# Patient Record
Sex: MALE | Employment: OTHER | URBAN - METROPOLITAN AREA
[De-identification: names, ages, dates, MRNs, and addresses within clinical notes are randomized per-mention and may not be internally consistent; named-entity substitution may affect disease eponyms.]

---

## 2017-02-15 PROBLEM — I48.0 PAROXYSMAL ATRIAL FIBRILLATION (HCC): Status: ACTIVE | Noted: 2017-02-15

## 2017-02-15 PROBLEM — I10 HYPERTENSION: Status: ACTIVE | Noted: 2017-02-15

## 2020-07-23 NOTE — PAT NURSING NOTE
WARREN 0530 @ [facility], park in [facility]/reg hh lobby. NPO after 2100, take oxycodone and inhalers in am with sip of water. LD vitamins/supplements/NSAIDS 7/28. ASA 7/30. Tylenol is okay to take week of surgery.  Continue rest of medications through 8/4. show

## 2020-07-24 ENCOUNTER — HOSPITAL ENCOUNTER (OUTPATIENT)
Dept: ULTRASOUND IMAGING | Facility: HOSPITAL | Age: 70
Discharge: HOME | End: 2020-07-24
Attending: THORACIC SURGERY (CARDIOTHORACIC VASCULAR SURGERY)
Payer: MEDICARE

## 2020-07-24 ENCOUNTER — HOSPITAL ENCOUNTER (OUTPATIENT)
Dept: GENERAL RADIOLOGY | Facility: HOSPITAL | Age: 70
Discharge: HOME | End: 2020-07-24
Attending: THORACIC SURGERY (CARDIOTHORACIC VASCULAR SURGERY)
Payer: MEDICARE

## 2020-07-24 ENCOUNTER — APPOINTMENT (OUTPATIENT)
Dept: LAB | Facility: HOSPITAL | Age: 70
End: 2020-07-24
Attending: THORACIC SURGERY (CARDIOTHORACIC VASCULAR SURGERY)
Payer: MEDICARE

## 2020-07-24 DIAGNOSIS — Z01.818 PREOP TESTING: ICD-10-CM

## 2020-07-24 DIAGNOSIS — Z91.89 AT RISK FOR BLEEDING: ICD-10-CM

## 2020-07-24 DIAGNOSIS — I35.1 AORTIC INSUFFICIENCY: ICD-10-CM

## 2020-07-24 DIAGNOSIS — I48.0 PAROXYSMAL ATRIAL FIBRILLATION (HCC): ICD-10-CM

## 2020-07-24 DIAGNOSIS — Z91.89 AT RISK FOR INFECTION: ICD-10-CM

## 2020-07-24 LAB
ALBUMIN SERPL-MCNC: 4.1 G/DL (ref 3.4–5)
ALBUMIN/GLOB SERPL: 1.1 {RATIO} (ref 1–2)
ALP LIVER SERPL-CCNC: 77 U/L (ref 45–117)
ALT SERPL-CCNC: 33 U/L (ref 16–61)
ANION GAP SERPL CALC-SCNC: 6 MMOL/L (ref 0–18)
ANTIBODY SCREEN: NEGATIVE
APTT PPP: 32.2 SECONDS (ref 25.4–36.1)
AST SERPL-CCNC: 17 U/L (ref 15–37)
ATRIAL RATE: 86 BPM
BASOPHILS # BLD AUTO: 0.04 X10(3) UL (ref 0–0.2)
BASOPHILS NFR BLD AUTO: 0.5 %
BILIRUB SERPL-MCNC: 0.8 MG/DL (ref 0.1–2)
BILIRUB UR QL STRIP.AUTO: NEGATIVE
BUN BLD-MCNC: 29 MG/DL (ref 7–18)
BUN/CREAT SERPL: 19.6 (ref 10–20)
CALCIUM BLD-MCNC: 9.8 MG/DL (ref 8.5–10.1)
CHLORIDE SERPL-SCNC: 95 MMOL/L (ref 98–112)
CO2 SERPL-SCNC: 29 MMOL/L (ref 21–32)
COLOR UR AUTO: YELLOW
CREAT BLD-MCNC: 1.48 MG/DL (ref 0.7–1.3)
DEPRECATED RDW RBC AUTO: 39.2 FL (ref 35.1–46.3)
EOSINOPHIL # BLD AUTO: 0.29 X10(3) UL (ref 0–0.7)
EOSINOPHIL NFR BLD AUTO: 3.4 %
ERYTHROCYTE [DISTWIDTH] IN BLOOD BY AUTOMATED COUNT: 11.9 % (ref 11–15)
EST. AVERAGE GLUCOSE BLD GHB EST-MCNC: 203 MG/DL (ref 68–126)
GLOBULIN PLAS-MCNC: 3.8 G/DL (ref 2.8–4.4)
GLUCOSE BLD-MCNC: 242 MG/DL (ref 70–99)
GLUCOSE UR STRIP.AUTO-MCNC: 50 MG/DL
HBA1C MFR BLD HPLC: 8.7 % (ref ?–5.7)
HCT VFR BLD AUTO: 45.3 % (ref 39–53)
HGB BLD-MCNC: 15.6 G/DL (ref 13–17.5)
IMM GRANULOCYTES # BLD AUTO: 0.04 X10(3) UL (ref 0–1)
IMM GRANULOCYTES NFR BLD: 0.5 %
INR BLD: 0.96 (ref 0.89–1.11)
KETONES UR STRIP.AUTO-MCNC: NEGATIVE MG/DL
LEUKOCYTE ESTERASE UR QL STRIP.AUTO: NEGATIVE
LYMPHOCYTES # BLD AUTO: 2.23 X10(3) UL (ref 1–4)
LYMPHOCYTES NFR BLD AUTO: 25.8 %
M PROTEIN MFR SERPL ELPH: 7.9 G/DL (ref 6.4–8.2)
MCH RBC QN AUTO: 31.1 PG (ref 26–34)
MCHC RBC AUTO-ENTMCNC: 34.4 G/DL (ref 31–37)
MCV RBC AUTO: 90.4 FL (ref 80–100)
MONOCYTES # BLD AUTO: 0.8 X10(3) UL (ref 0.1–1)
MONOCYTES NFR BLD AUTO: 9.2 %
NEUTROPHILS # BLD AUTO: 5.25 X10 (3) UL (ref 1.5–7.7)
NEUTROPHILS # BLD AUTO: 5.25 X10(3) UL (ref 1.5–7.7)
NEUTROPHILS NFR BLD AUTO: 60.6 %
NITRITE UR QL STRIP.AUTO: NEGATIVE
OSMOLALITY SERPL CALC.SUM OF ELEC: 284 MOSM/KG (ref 275–295)
P AXIS: 29 DEGREES
P-R INTERVAL: 206 MS
PATIENT FASTING Y/N/NP: NO
PH UR STRIP.AUTO: 5 [PH] (ref 4.5–8)
PLATELET # BLD AUTO: 197 10(3)UL (ref 150–450)
POTASSIUM SERPL-SCNC: 4.8 MMOL/L (ref 3.5–5.1)
PROT UR STRIP.AUTO-MCNC: NEGATIVE MG/DL
PSA SERPL DL<=0.01 NG/ML-MCNC: 13.1 SECONDS (ref 12.4–14.6)
Q-T INTERVAL: 378 MS
QRS DURATION: 104 MS
QTC CALCULATION (BEZET): 452 MS
R AXIS: -21 DEGREES
RBC # BLD AUTO: 5.01 X10(6)UL (ref 3.8–5.8)
RBC UR QL AUTO: NEGATIVE
RH BLOOD TYPE: POSITIVE
SODIUM SERPL-SCNC: 130 MMOL/L (ref 136–145)
SP GR UR STRIP.AUTO: 1.01 (ref 1–1.03)
T AXIS: 68 DEGREES
UROBILINOGEN UR STRIP.AUTO-MCNC: <2 MG/DL
VENTRICULAR RATE: 86 BPM
WBC # BLD AUTO: 8.7 X10(3) UL (ref 4–11)

## 2020-07-24 PROCEDURE — 85025 COMPLETE CBC W/AUTO DIFF WBC: CPT

## 2020-07-24 PROCEDURE — 85610 PROTHROMBIN TIME: CPT

## 2020-07-24 PROCEDURE — 36415 COLL VENOUS BLD VENIPUNCTURE: CPT

## 2020-07-24 PROCEDURE — 81003 URINALYSIS AUTO W/O SCOPE: CPT

## 2020-07-24 PROCEDURE — 93010 ELECTROCARDIOGRAM REPORT: CPT | Performed by: INTERNAL MEDICINE

## 2020-07-24 PROCEDURE — 80053 COMPREHEN METABOLIC PANEL: CPT

## 2020-07-24 PROCEDURE — 85730 THROMBOPLASTIN TIME PARTIAL: CPT

## 2020-07-24 PROCEDURE — 93880 EXTRACRANIAL BILAT STUDY: CPT | Performed by: THORACIC SURGERY (CARDIOTHORACIC VASCULAR SURGERY)

## 2020-07-24 PROCEDURE — 86900 BLOOD TYPING SEROLOGIC ABO: CPT

## 2020-07-24 PROCEDURE — 86901 BLOOD TYPING SEROLOGIC RH(D): CPT

## 2020-07-24 PROCEDURE — 93005 ELECTROCARDIOGRAM TRACING: CPT

## 2020-07-24 PROCEDURE — 83036 HEMOGLOBIN GLYCOSYLATED A1C: CPT

## 2020-07-24 PROCEDURE — 71045 X-RAY EXAM CHEST 1 VIEW: CPT | Performed by: THORACIC SURGERY (CARDIOTHORACIC VASCULAR SURGERY)

## 2020-07-24 PROCEDURE — 86850 RBC ANTIBODY SCREEN: CPT

## 2020-07-30 ENCOUNTER — TELEPHONE (OUTPATIENT)
Dept: CARDIOLOGY UNIT | Facility: HOSPITAL | Age: 70
End: 2020-07-30

## 2020-07-30 NOTE — PROGRESS NOTES
Reviewed labs with Dr. Melissa Finch, ok to proceed.   Amador Velasquez RN  Clinical Coordinator  CV Surgery

## 2020-08-03 ENCOUNTER — LAB ENCOUNTER (OUTPATIENT)
Dept: LAB | Facility: HOSPITAL | Age: 70
DRG: 220 | End: 2020-08-03
Attending: THORACIC SURGERY (CARDIOTHORACIC VASCULAR SURGERY)
Payer: MEDICARE

## 2020-08-03 DIAGNOSIS — I48.0 PAROXYSMAL ATRIAL FIBRILLATION (HCC): ICD-10-CM

## 2020-08-03 DIAGNOSIS — Z01.818 PREOP TESTING: ICD-10-CM

## 2020-08-03 DIAGNOSIS — I35.1 AORTIC INSUFFICIENCY: ICD-10-CM

## 2020-08-03 DIAGNOSIS — Z91.89 AT RISK FOR INFECTION: ICD-10-CM

## 2020-08-03 DIAGNOSIS — Z91.89 AT RISK FOR BLEEDING: ICD-10-CM

## 2020-08-03 LAB — SARS-COV-2 RNA RESP QL NAA+PROBE: NOT DETECTED

## 2020-08-05 ENCOUNTER — APPOINTMENT (OUTPATIENT)
Dept: GENERAL RADIOLOGY | Facility: HOSPITAL | Age: 70
DRG: 220 | End: 2020-08-05
Attending: PHYSICIAN ASSISTANT
Payer: MEDICARE

## 2020-08-05 ENCOUNTER — APPOINTMENT (OUTPATIENT)
Dept: GENERAL RADIOLOGY | Facility: HOSPITAL | Age: 70
DRG: 220 | End: 2020-08-05
Attending: THORACIC SURGERY (CARDIOTHORACIC VASCULAR SURGERY)
Payer: MEDICARE

## 2020-08-05 ENCOUNTER — HOSPITAL ENCOUNTER (INPATIENT)
Facility: HOSPITAL | Age: 70
LOS: 7 days | Discharge: HOME HEALTH | DRG: 220 | End: 2020-08-12
Attending: THORACIC SURGERY (CARDIOTHORACIC VASCULAR SURGERY) | Admitting: THORACIC SURGERY (CARDIOTHORACIC VASCULAR SURGERY)
Payer: MEDICARE

## 2020-08-05 ENCOUNTER — ANESTHESIA (OUTPATIENT)
Dept: CARDIAC SURGERY | Facility: HOSPITAL | Age: 70
DRG: 220 | End: 2020-08-05
Payer: MEDICARE

## 2020-08-05 ENCOUNTER — ANESTHESIA EVENT (OUTPATIENT)
Dept: CARDIAC SURGERY | Facility: HOSPITAL | Age: 70
DRG: 220 | End: 2020-08-05
Payer: MEDICARE

## 2020-08-05 DIAGNOSIS — Z91.89 AT RISK FOR INFECTION: ICD-10-CM

## 2020-08-05 DIAGNOSIS — Z01.818 PREOP TESTING: ICD-10-CM

## 2020-08-05 DIAGNOSIS — I48.0 PAROXYSMAL ATRIAL FIBRILLATION (HCC): ICD-10-CM

## 2020-08-05 DIAGNOSIS — I35.1 AORTIC INSUFFICIENCY: Primary | ICD-10-CM

## 2020-08-05 DIAGNOSIS — Z91.89 AT RISK FOR BLEEDING: ICD-10-CM

## 2020-08-05 LAB
APTT PPP: 33.7 SECONDS (ref 25.4–36.1)
ARTERIAL BLD GAS O2 SATURATION: 96 % (ref 92–100)
ARTERIAL BLOOD GAS BASE EXCESS: -3.1 MMOL/L (ref ?–2)
ARTERIAL BLOOD GAS HCO3: 20.5 MEQ/L (ref 22–26)
ARTERIAL BLOOD GAS PCO2: 34 MM HG (ref 35–45)
ARTERIAL BLOOD GAS PH: 7.4 (ref 7.35–7.45)
ARTERIAL BLOOD GAS PO2: 108 MM HG (ref 80–105)
ATRIAL RATE: 90 BPM
BUN BLD-MCNC: 24 MG/DL (ref 7–18)
CALCIUM BLD-MCNC: 7.9 MG/DL (ref 8.5–10.1)
CALCULATED O2 SATURATION: 98 % (ref 92–100)
CARBOXYHEMOGLOBIN: 0.8 % SAT (ref 0–3)
CHLORIDE SERPL-SCNC: 108 MMOL/L (ref 98–112)
CHOLEST SMN-MCNC: 118 MG/DL (ref ?–200)
CO2 SERPL-SCNC: 26 MMOL/L (ref 21–32)
CREAT BLD-MCNC: 1.02 MG/DL (ref 0.7–1.3)
DEPRECATED RDW RBC AUTO: 41 FL (ref 35.1–46.3)
ERYTHROCYTE [DISTWIDTH] IN BLOOD BY AUTOMATED COUNT: 12 % (ref 11–15)
FIBRINOGEN: 241 MG/DL (ref 200–446)
FIO2: 40 %
FIO2: 50 %
GLUCOSE BLD-MCNC: 115 MG/DL (ref 70–99)
GLUCOSE BLD-MCNC: 118 MG/DL (ref 70–99)
GLUCOSE BLD-MCNC: 130 MG/DL (ref 70–99)
GLUCOSE BLD-MCNC: 130 MG/DL (ref 70–99)
GLUCOSE BLD-MCNC: 135 MG/DL (ref 70–99)
GLUCOSE BLD-MCNC: 144 MG/DL (ref 70–99)
GLUCOSE BLD-MCNC: 149 MG/DL (ref 70–99)
GLUCOSE BLD-MCNC: 154 MG/DL (ref 70–99)
GLUCOSE BLD-MCNC: 159 MG/DL (ref 70–99)
GLUCOSE BLD-MCNC: 165 MG/DL (ref 70–99)
GLUCOSE BLD-MCNC: 167 MG/DL (ref 70–99)
GLUCOSE BLD-MCNC: 167 MG/DL (ref 70–99)
GLUCOSE BLD-MCNC: 168 MG/DL (ref 70–99)
GLUCOSE BLD-MCNC: 173 MG/DL (ref 70–99)
GLUCOSE BLD-MCNC: 179 MG/DL (ref 70–99)
GLUCOSE BLD-MCNC: 184 MG/DL (ref 70–99)
GLUCOSE BLD-MCNC: 199 MG/DL (ref 70–99)
GLUCOSE BLD-MCNC: 215 MG/DL (ref 70–99)
GLUCOSE BLD-MCNC: 217 MG/DL (ref 70–99)
GLUCOSE BLD-MCNC: 224 MG/DL (ref 70–99)
HAV IGM SER QL: 2.2 MG/DL (ref 1.6–2.6)
HCT VFR BLD AUTO: 36.3 % (ref 39–53)
HDLC SERPL-MCNC: 48 MG/DL (ref 40–59)
HGB BLD-MCNC: 12.1 G/DL (ref 13–17.5)
INR BLD: 1.41 (ref 0.89–1.11)
ISTAT ACTIVATED CLOTTING TIME: 114 SECONDS (ref 74–137)
ISTAT ACTIVATED CLOTTING TIME: 125 SECONDS (ref 74–137)
ISTAT ACTIVATED CLOTTING TIME: 125 SECONDS (ref 74–137)
ISTAT ACTIVATED CLOTTING TIME: 500 SECONDS (ref 74–137)
ISTAT ACTIVATED CLOTTING TIME: 527 SECONDS (ref 74–137)
ISTAT ACTIVATED CLOTTING TIME: 560 SECONDS (ref 74–137)
ISTAT ACTIVATED CLOTTING TIME: 577 SECONDS (ref 74–137)
ISTAT BLOOD GAS BASE EXCESS: -1 MMOL/L (ref ?–30)
ISTAT BLOOD GAS BASE EXCESS: -3 MMOL/L (ref ?–30)
ISTAT BLOOD GAS BASE EXCESS: -3 MMOL/L (ref ?–30)
ISTAT BLOOD GAS BASE EXCESS: -4 MMOL/L (ref ?–30)
ISTAT BLOOD GAS BASE EXCESS: 0 MMOL/L (ref ?–30)
ISTAT BLOOD GAS BASE EXCESS: 0 MMOL/L (ref ?–30)
ISTAT BLOOD GAS HCO3: 22.4 MEQ/L (ref 22–26)
ISTAT BLOOD GAS HCO3: 23.3 MEQ/L (ref 22–26)
ISTAT BLOOD GAS HCO3: 24.3 MEQ/L (ref 22–26)
ISTAT BLOOD GAS HCO3: 24.4 MEQ/L (ref 22–26)
ISTAT BLOOD GAS HCO3: 24.6 MEQ/L (ref 22–26)
ISTAT BLOOD GAS HCO3: 24.9 MEQ/L (ref 22–26)
ISTAT BLOOD GAS O2 SATURATION: 100 % (ref 92–100)
ISTAT BLOOD GAS O2 SATURATION: 100 % (ref 92–100)
ISTAT BLOOD GAS O2 SATURATION: 63 % (ref 60–85)
ISTAT BLOOD GAS O2 SATURATION: 87 % (ref 60–85)
ISTAT BLOOD GAS O2 SATURATION: 88 % (ref 60–85)
ISTAT BLOOD GAS O2 SATURATION: 98 % (ref 92–100)
ISTAT BLOOD GAS PCO2: 35.7 MMHG (ref 38–50)
ISTAT BLOOD GAS PCO2: 40.3 MMHG (ref 35–45)
ISTAT BLOOD GAS PCO2: 40.8 MMHG (ref 38–50)
ISTAT BLOOD GAS PCO2: 43.1 MMHG (ref 35–45)
ISTAT BLOOD GAS PCO2: 52.9 MMHG (ref 35–45)
ISTAT BLOOD GAS PCO2: 55.8 MMHG (ref 38–50)
ISTAT BLOOD GAS PH: 7.25 (ref 7.32–7.43)
ISTAT BLOOD GAS PH: 7.25 (ref 7.35–7.45)
ISTAT BLOOD GAS PH: 7.35 (ref 7.32–7.43)
ISTAT BLOOD GAS PH: 7.36 (ref 7.35–7.45)
ISTAT BLOOD GAS PH: 7.4 (ref 7.35–7.45)
ISTAT BLOOD GAS PH: 7.45 (ref 7.32–7.43)
ISTAT BLOOD GAS PO2: 130 MMHG (ref 80–105)
ISTAT BLOOD GAS PO2: 219 MMHG (ref 80–105)
ISTAT BLOOD GAS PO2: 398 MMHG (ref 80–105)
ISTAT BLOOD GAS PO2: <70 MMHG (ref 35–40)
ISTAT BLOOD GAS TCO2: 24 MMOL/L (ref 22–32)
ISTAT BLOOD GAS TCO2: 25 MMOL/L (ref 22–32)
ISTAT BLOOD GAS TCO2: 26 MMOL/L (ref 22–32)
ISTAT HEMATOCRIT: 27 % (ref 37–53)
ISTAT HEMATOCRIT: 31 % (ref 37–53)
ISTAT HEMATOCRIT: 34 % (ref 37–53)
ISTAT HEMATOCRIT: 34 % (ref 37–53)
ISTAT IONIZED CALCIUM: 1.11 MMOL/L (ref 1.12–1.32)
ISTAT IONIZED CALCIUM: 1.18 MMOL/L (ref 1.12–1.32)
ISTAT IONIZED CALCIUM: 1.22 MMOL/L (ref 1.12–1.32)
ISTAT IONIZED CALCIUM: 1.23 MMOL/L (ref 1.12–1.32)
ISTAT IONIZED CALCIUM: 1.3 MMOL/L (ref 1.12–1.32)
ISTAT IONIZED CALCIUM: 1.52 MMOL/L (ref 1.12–1.32)
ISTAT POTASSIUM: 4.3 MMOL/L (ref 3.6–5.1)
ISTAT POTASSIUM: 4.7 MMOL/L (ref 3.6–5.1)
ISTAT POTASSIUM: 4.7 MMOL/L (ref 3.6–5.1)
ISTAT POTASSIUM: 5.8 MMOL/L (ref 3.6–5.1)
ISTAT POTASSIUM: 6 MMOL/L (ref 3.6–5.1)
ISTAT POTASSIUM: 6.5 MMOL/L (ref 3.6–5.1)
ISTAT SODIUM: 127 MMOL/L (ref 136–145)
ISTAT SODIUM: 130 MMOL/L (ref 136–145)
ISTAT SODIUM: 131 MMOL/L (ref 136–145)
ISTAT SODIUM: 134 MMOL/L (ref 136–145)
ISTAT SODIUM: 136 MMOL/L (ref 136–145)
ISTAT SODIUM: 139 MMOL/L (ref 136–145)
LDLC SERPL CALC-MCNC: 31 MG/DL (ref ?–100)
MCH RBC QN AUTO: 31.3 PG (ref 26–34)
MCHC RBC AUTO-ENTMCNC: 33.3 G/DL (ref 31–37)
MCV RBC AUTO: 93.8 FL (ref 80–100)
METHEMOGLOBIN: 0.5 % SAT (ref 0.4–1.5)
NONHDLC SERPL-MCNC: 70 MG/DL (ref ?–130)
P AXIS: 60 DEGREES
P/F RATIO: 247.1 MMHG
PATIENT TEMPERATURE: 101.3 F
PEEP: 5 CM H2O
PEEP: 5 CM H2O
PLATELET # BLD AUTO: 104 10(3)UL (ref 150–450)
POTASSIUM SERPL-SCNC: 4.2 MMOL/L (ref 3.5–5.1)
PRESSURE SUPPORT: 5 CM H2O
PSA SERPL DL<=0.01 NG/ML-MCNC: 17.7 SECONDS (ref 12.4–14.6)
Q-T INTERVAL: 254 MS
QRS DURATION: 114 MS
QTC CALCULATION (BEZET): 443 MS
R AXIS: -32 DEGREES
RBC # BLD AUTO: 3.87 X10(6)UL (ref 3.8–5.8)
SODIUM SERPL-SCNC: 137 MMOL/L (ref 136–145)
T AXIS: 137 DEGREES
TIDAL VOLUME: 600 ML
TOTAL HEMOGLOBIN: 12.6 G/DL (ref 12.6–17.4)
TRIGL SERPL-MCNC: 195 MG/DL (ref 30–149)
VENOUS BASE EXCESS: -2.7
VENOUS BLOOD GAS HCO3: 23.3 MEQ/L (ref 23–27)
VENOUS O2 SAT CALC: 51 % (ref 72–78)
VENOUS O2 SATURATION: 51 % (ref 72–78)
VENOUS PCO2: 45 MM HG (ref 38–50)
VENOUS PH: 7.33 (ref 7.33–7.43)
VENOUS PO2: 30 MM HG (ref 30–50)
VENT RATE: 14 /MIN
VENTRICULAR RATE: 183 BPM
VLDLC SERPL CALC-MCNC: 39 MG/DL (ref 0–30)
WBC # BLD AUTO: 8.6 X10(3) UL (ref 4–11)

## 2020-08-05 PROCEDURE — 85347 COAGULATION TIME ACTIVATED: CPT

## 2020-08-05 PROCEDURE — 84295 ASSAY OF SERUM SODIUM: CPT

## 2020-08-05 PROCEDURE — 76942 ECHO GUIDE FOR BIOPSY: CPT | Performed by: ANESTHESIOLOGY

## 2020-08-05 PROCEDURE — 93312 ECHO TRANSESOPHAGEAL: CPT | Performed by: ANESTHESIOLOGY

## 2020-08-05 PROCEDURE — 88311 DECALCIFY TISSUE: CPT | Performed by: THORACIC SURGERY (CARDIOTHORACIC VASCULAR SURGERY)

## 2020-08-05 PROCEDURE — 82330 ASSAY OF CALCIUM: CPT

## 2020-08-05 PROCEDURE — 80061 LIPID PANEL: CPT | Performed by: THORACIC SURGERY (CARDIOTHORACIC VASCULAR SURGERY)

## 2020-08-05 PROCEDURE — 80048 BASIC METABOLIC PNL TOTAL CA: CPT | Performed by: THORACIC SURGERY (CARDIOTHORACIC VASCULAR SURGERY)

## 2020-08-05 PROCEDURE — 36415 COLL VENOUS BLD VENIPUNCTURE: CPT

## 2020-08-05 PROCEDURE — 83050 HGB METHEMOGLOBIN QUAN: CPT | Performed by: ANESTHESIOLOGY

## 2020-08-05 PROCEDURE — 5A1221Z PERFORMANCE OF CARDIAC OUTPUT, CONTINUOUS: ICD-10-PCS | Performed by: THORACIC SURGERY (CARDIOTHORACIC VASCULAR SURGERY)

## 2020-08-05 PROCEDURE — 88305 TISSUE EXAM BY PATHOLOGIST: CPT | Performed by: THORACIC SURGERY (CARDIOTHORACIC VASCULAR SURGERY)

## 2020-08-05 PROCEDURE — 71045 X-RAY EXAM CHEST 1 VIEW: CPT | Performed by: PHYSICIAN ASSISTANT

## 2020-08-05 PROCEDURE — C9113 INJ PANTOPRAZOLE SODIUM, VIA: HCPCS | Performed by: THORACIC SURGERY (CARDIOTHORACIC VASCULAR SURGERY)

## 2020-08-05 PROCEDURE — P9047 ALBUMIN (HUMAN), 25%, 50ML: HCPCS

## 2020-08-05 PROCEDURE — 85027 COMPLETE CBC AUTOMATED: CPT | Performed by: THORACIC SURGERY (CARDIOTHORACIC VASCULAR SURGERY)

## 2020-08-05 PROCEDURE — 82375 ASSAY CARBOXYHB QUANT: CPT | Performed by: ANESTHESIOLOGY

## 2020-08-05 PROCEDURE — 02580ZZ DESTRUCTION OF CONDUCTION MECHANISM, OPEN APPROACH: ICD-10-PCS | Performed by: THORACIC SURGERY (CARDIOTHORACIC VASCULAR SURGERY)

## 2020-08-05 PROCEDURE — 94002 VENT MGMT INPAT INIT DAY: CPT

## 2020-08-05 PROCEDURE — 85018 HEMOGLOBIN: CPT | Performed by: ANESTHESIOLOGY

## 2020-08-05 PROCEDURE — 84132 ASSAY OF SERUM POTASSIUM: CPT

## 2020-08-05 PROCEDURE — 82803 BLOOD GASES ANY COMBINATION: CPT | Performed by: PHYSICIAN ASSISTANT

## 2020-08-05 PROCEDURE — 85014 HEMATOCRIT: CPT

## 2020-08-05 PROCEDURE — 94150 VITAL CAPACITY TEST: CPT

## 2020-08-05 PROCEDURE — 71045 X-RAY EXAM CHEST 1 VIEW: CPT | Performed by: THORACIC SURGERY (CARDIOTHORACIC VASCULAR SURGERY)

## 2020-08-05 PROCEDURE — 30233N0 TRANSFUSION OF AUTOLOGOUS RED BLOOD CELLS INTO PERIPHERAL VEIN, PERCUTANEOUS APPROACH: ICD-10-PCS | Performed by: THORACIC SURGERY (CARDIOTHORACIC VASCULAR SURGERY)

## 2020-08-05 PROCEDURE — 82962 GLUCOSE BLOOD TEST: CPT

## 2020-08-05 PROCEDURE — 85610 PROTHROMBIN TIME: CPT | Performed by: THORACIC SURGERY (CARDIOTHORACIC VASCULAR SURGERY)

## 2020-08-05 PROCEDURE — 85384 FIBRINOGEN ACTIVITY: CPT | Performed by: THORACIC SURGERY (CARDIOTHORACIC VASCULAR SURGERY)

## 2020-08-05 PROCEDURE — 83735 ASSAY OF MAGNESIUM: CPT | Performed by: THORACIC SURGERY (CARDIOTHORACIC VASCULAR SURGERY)

## 2020-08-05 PROCEDURE — 85730 THROMBOPLASTIN TIME PARTIAL: CPT | Performed by: THORACIC SURGERY (CARDIOTHORACIC VASCULAR SURGERY)

## 2020-08-05 PROCEDURE — 82803 BLOOD GASES ANY COMBINATION: CPT

## 2020-08-05 PROCEDURE — 93005 ELECTROCARDIOGRAM TRACING: CPT

## 2020-08-05 PROCEDURE — 93010 ELECTROCARDIOGRAM REPORT: CPT | Performed by: INTERNAL MEDICINE

## 2020-08-05 PROCEDURE — 02RF08Z REPLACEMENT OF AORTIC VALVE WITH ZOOPLASTIC TISSUE, OPEN APPROACH: ICD-10-PCS | Performed by: THORACIC SURGERY (CARDIOTHORACIC VASCULAR SURGERY)

## 2020-08-05 PROCEDURE — 82803 BLOOD GASES ANY COMBINATION: CPT | Performed by: ANESTHESIOLOGY

## 2020-08-05 PROCEDURE — 86920 COMPATIBILITY TEST SPIN: CPT

## 2020-08-05 PROCEDURE — 02B70ZK EXCISION OF LEFT ATRIAL APPENDAGE, OPEN APPROACH: ICD-10-PCS | Performed by: THORACIC SURGERY (CARDIOTHORACIC VASCULAR SURGERY)

## 2020-08-05 PROCEDURE — B24BZZ4 ULTRASONOGRAPHY OF HEART WITH AORTA, TRANSESOPHAGEAL: ICD-10-PCS | Performed by: THORACIC SURGERY (CARDIOTHORACIC VASCULAR SURGERY)

## 2020-08-05 PROCEDURE — S0028 INJECTION, FAMOTIDINE, 20 MG: HCPCS | Performed by: ANESTHESIOLOGY

## 2020-08-05 DEVICE — IMPLANTABLE DEVICE
Type: IMPLANTABLE DEVICE | Status: FUNCTIONAL
Brand: STERNALOCK® BLU SYSTEM

## 2020-08-05 DEVICE — IMPLANTABLE DEVICE
Type: IMPLANTABLE DEVICE | Site: CHEST | Status: FUNCTIONAL
Brand: STERNALOCK® BLU SYSTEM

## 2020-08-05 DEVICE — VALVE AORTIC 25MM INSPIRIS: Type: IMPLANTABLE DEVICE | Site: HEART | Status: FUNCTIONAL

## 2020-08-05 RX ORDER — FAMOTIDINE 10 MG/ML
INJECTION, SOLUTION INTRAVENOUS AS NEEDED
Status: DISCONTINUED | OUTPATIENT
Start: 2020-08-05 | End: 2020-08-05 | Stop reason: SURG

## 2020-08-05 RX ORDER — ACETAMINOPHEN 10 MG/ML
INJECTION, SOLUTION INTRAVENOUS AS NEEDED
Status: DISCONTINUED | OUTPATIENT
Start: 2020-08-05 | End: 2020-08-05 | Stop reason: SURG

## 2020-08-05 RX ORDER — LIDOCAINE HYDROCHLORIDE 10 MG/ML
INJECTION, SOLUTION EPIDURAL; INFILTRATION; INTRACAUDAL; PERINEURAL AS NEEDED
Status: DISCONTINUED | OUTPATIENT
Start: 2020-08-05 | End: 2020-08-05 | Stop reason: SURG

## 2020-08-05 RX ORDER — MELATONIN
3 NIGHTLY PRN
Status: DISCONTINUED | OUTPATIENT
Start: 2020-08-05 | End: 2020-08-12

## 2020-08-05 RX ORDER — HEPARIN SODIUM 1000 [USP'U]/ML
INJECTION, SOLUTION INTRAVENOUS; SUBCUTANEOUS AS NEEDED
Status: DISCONTINUED | OUTPATIENT
Start: 2020-08-05 | End: 2020-08-05 | Stop reason: SURG

## 2020-08-05 RX ORDER — DIPHENHYDRAMINE HYDROCHLORIDE 50 MG/ML
12.5 INJECTION INTRAMUSCULAR; INTRAVENOUS EVERY 4 HOURS PRN
Status: DISCONTINUED | OUTPATIENT
Start: 2020-08-05 | End: 2020-08-07

## 2020-08-05 RX ORDER — MAGNESIUM SULFATE HEPTAHYDRATE 40 MG/ML
2 INJECTION, SOLUTION INTRAVENOUS AS NEEDED
Status: DISCONTINUED | OUTPATIENT
Start: 2020-08-05 | End: 2020-08-10

## 2020-08-05 RX ORDER — SODIUM CHLORIDE 9 MG/ML
INJECTION, SOLUTION INTRAVENOUS CONTINUOUS
Status: DISCONTINUED | OUTPATIENT
Start: 2020-08-05 | End: 2020-08-05

## 2020-08-05 RX ORDER — DOBUTAMINE HYDROCHLORIDE 200 MG/100ML
INJECTION INTRAVENOUS CONTINUOUS PRN
Status: DISCONTINUED | OUTPATIENT
Start: 2020-08-05 | End: 2020-08-05 | Stop reason: SURG

## 2020-08-05 RX ORDER — VANCOMYCIN HYDROCHLORIDE
15 EVERY 12 HOURS
Status: COMPLETED | OUTPATIENT
Start: 2020-08-05 | End: 2020-08-06

## 2020-08-05 RX ORDER — POTASSIUM CHLORIDE 14.9 MG/ML
20 INJECTION INTRAVENOUS AS NEEDED
Status: DISCONTINUED | OUTPATIENT
Start: 2020-08-05 | End: 2020-08-11 | Stop reason: ALTCHOICE

## 2020-08-05 RX ORDER — ATORVASTATIN CALCIUM 20 MG/1
20 TABLET, FILM COATED ORAL NIGHTLY
Status: DISCONTINUED | OUTPATIENT
Start: 2020-08-05 | End: 2020-08-12

## 2020-08-05 RX ORDER — ALBUMIN (HUMAN) 12.5 G/50ML
SOLUTION INTRAVENOUS
Status: DISCONTINUED
Start: 2020-08-05 | End: 2020-08-05 | Stop reason: WASHOUT

## 2020-08-05 RX ORDER — NALOXONE HYDROCHLORIDE 0.4 MG/ML
0.08 INJECTION, SOLUTION INTRAMUSCULAR; INTRAVENOUS; SUBCUTANEOUS
Status: DISCONTINUED | OUTPATIENT
Start: 2020-08-05 | End: 2020-08-07

## 2020-08-05 RX ORDER — METHYLPREDNISOLONE SODIUM SUCCINATE 500 MG/1
INJECTION, POWDER, FOR SOLUTION INTRAMUSCULAR; INTRAVENOUS AS NEEDED
Status: DISCONTINUED | OUTPATIENT
Start: 2020-08-05 | End: 2020-08-05 | Stop reason: SURG

## 2020-08-05 RX ORDER — MIDAZOLAM HYDROCHLORIDE 1 MG/ML
INJECTION INTRAMUSCULAR; INTRAVENOUS AS NEEDED
Status: DISCONTINUED | OUTPATIENT
Start: 2020-08-05 | End: 2020-08-05 | Stop reason: SURG

## 2020-08-05 RX ORDER — FLUTICASONE PROPIONATE 50 MCG
1 SPRAY, SUSPENSION (ML) NASAL DAILY
Status: DISCONTINUED | OUTPATIENT
Start: 2020-08-06 | End: 2020-08-12

## 2020-08-05 RX ORDER — LEVOFLOXACIN 5 MG/ML
INJECTION, SOLUTION INTRAVENOUS AS NEEDED
Status: DISCONTINUED | OUTPATIENT
Start: 2020-08-05 | End: 2020-08-05 | Stop reason: SURG

## 2020-08-05 RX ORDER — BISACODYL 10 MG
10 SUPPOSITORY, RECTAL RECTAL
Status: DISCONTINUED | OUTPATIENT
Start: 2020-08-05 | End: 2020-08-12

## 2020-08-05 RX ORDER — VANCOMYCIN HYDROCHLORIDE 1 G/20ML
INJECTION, POWDER, LYOPHILIZED, FOR SOLUTION INTRAVENOUS AS NEEDED
Status: DISCONTINUED | OUTPATIENT
Start: 2020-08-05 | End: 2020-08-05 | Stop reason: SURG

## 2020-08-05 RX ORDER — DOBUTAMINE HYDROCHLORIDE 200 MG/100ML
INJECTION INTRAVENOUS CONTINUOUS PRN
Status: DISCONTINUED | OUTPATIENT
Start: 2020-08-05 | End: 2020-08-10

## 2020-08-05 RX ORDER — SODIUM CHLORIDE 9 MG/ML
INJECTION, SOLUTION INTRAVENOUS CONTINUOUS
Status: DISCONTINUED | OUTPATIENT
Start: 2020-08-05 | End: 2020-08-10

## 2020-08-05 RX ORDER — MAGNESIUM SULFATE 1 G/100ML
1 INJECTION INTRAVENOUS AS NEEDED
Status: DISCONTINUED | OUTPATIENT
Start: 2020-08-05 | End: 2020-08-10

## 2020-08-05 RX ORDER — ACETAMINOPHEN 10 MG/ML
1000 INJECTION, SOLUTION INTRAVENOUS EVERY 6 HOURS PRN
Status: DISPENSED | OUTPATIENT
Start: 2020-08-05 | End: 2020-08-06

## 2020-08-05 RX ORDER — ROCURONIUM BROMIDE 10 MG/ML
INJECTION, SOLUTION INTRAVENOUS AS NEEDED
Status: DISCONTINUED | OUTPATIENT
Start: 2020-08-05 | End: 2020-08-05 | Stop reason: SURG

## 2020-08-05 RX ORDER — ALBUTEROL SULFATE 90 UG/1
AEROSOL, METERED RESPIRATORY (INHALATION) AS NEEDED
Status: DISCONTINUED | OUTPATIENT
Start: 2020-08-05 | End: 2020-08-05 | Stop reason: SURG

## 2020-08-05 RX ORDER — ONDANSETRON 2 MG/ML
4 INJECTION INTRAMUSCULAR; INTRAVENOUS EVERY 6 HOURS PRN
Status: DISCONTINUED | OUTPATIENT
Start: 2020-08-05 | End: 2020-08-12

## 2020-08-05 RX ORDER — NITROGLYCERIN 20 MG/100ML
INJECTION INTRAVENOUS CONTINUOUS PRN
Status: DISCONTINUED | OUTPATIENT
Start: 2020-08-05 | End: 2020-08-11 | Stop reason: ALTCHOICE

## 2020-08-05 RX ORDER — POTASSIUM CHLORIDE 29.8 MG/ML
40 INJECTION INTRAVENOUS AS NEEDED
Status: DISCONTINUED | OUTPATIENT
Start: 2020-08-05 | End: 2020-08-11 | Stop reason: ALTCHOICE

## 2020-08-05 RX ORDER — MONTELUKAST SODIUM 10 MG/1
10 TABLET ORAL DAILY
Status: DISCONTINUED | OUTPATIENT
Start: 2020-08-05 | End: 2020-08-12

## 2020-08-05 RX ORDER — PANTOPRAZOLE SODIUM 40 MG/1
40 TABLET, DELAYED RELEASE ORAL
Status: DISCONTINUED | OUTPATIENT
Start: 2020-08-05 | End: 2020-08-12

## 2020-08-05 RX ORDER — METOCLOPRAMIDE HYDROCHLORIDE 5 MG/ML
INJECTION INTRAMUSCULAR; INTRAVENOUS AS NEEDED
Status: DISCONTINUED | OUTPATIENT
Start: 2020-08-05 | End: 2020-08-05 | Stop reason: SURG

## 2020-08-05 RX ORDER — BACITRACIN 50000 [USP'U]/1
INJECTION, POWDER, LYOPHILIZED, FOR SOLUTION INTRAMUSCULAR AS NEEDED
Status: DISCONTINUED | OUTPATIENT
Start: 2020-08-05 | End: 2020-08-05 | Stop reason: HOSPADM

## 2020-08-05 RX ORDER — DEXMEDETOMIDINE HYDROCHLORIDE 4 UG/ML
INJECTION, SOLUTION INTRAVENOUS CONTINUOUS PRN
Status: DISCONTINUED | OUTPATIENT
Start: 2020-08-05 | End: 2020-08-05 | Stop reason: SURG

## 2020-08-05 RX ORDER — MIDAZOLAM HYDROCHLORIDE 1 MG/ML
1 INJECTION INTRAMUSCULAR; INTRAVENOUS EVERY 30 MIN PRN
Status: DISCONTINUED | OUTPATIENT
Start: 2020-08-05 | End: 2020-08-07

## 2020-08-05 RX ORDER — DIPHENHYDRAMINE HYDROCHLORIDE 50 MG/ML
INJECTION INTRAMUSCULAR; INTRAVENOUS AS NEEDED
Status: DISCONTINUED | OUTPATIENT
Start: 2020-08-05 | End: 2020-08-05 | Stop reason: SURG

## 2020-08-05 RX ORDER — FLECAINIDE ACETATE 100 MG/1
150 TABLET ORAL 2 TIMES DAILY
Status: DISCONTINUED | OUTPATIENT
Start: 2020-08-05 | End: 2020-08-07

## 2020-08-05 RX ORDER — CHLORHEXIDINE GLUCONATE 0.12 MG/ML
15 RINSE ORAL
Status: DISCONTINUED | OUTPATIENT
Start: 2020-08-05 | End: 2020-08-05

## 2020-08-05 RX ORDER — PROTAMINE SULFATE 10 MG/ML
INJECTION, SOLUTION INTRAVENOUS AS NEEDED
Status: DISCONTINUED | OUTPATIENT
Start: 2020-08-05 | End: 2020-08-05 | Stop reason: SURG

## 2020-08-05 RX ORDER — POLYETHYLENE GLYCOL 3350 17 G/17G
17 POWDER, FOR SOLUTION ORAL DAILY PRN
Status: DISCONTINUED | OUTPATIENT
Start: 2020-08-05 | End: 2020-08-12

## 2020-08-05 RX ORDER — SODIUM PHOSPHATE, DIBASIC AND SODIUM PHOSPHATE, MONOBASIC 7; 19 G/133ML; G/133ML
1 ENEMA RECTAL ONCE AS NEEDED
Status: DISCONTINUED | OUTPATIENT
Start: 2020-08-05 | End: 2020-08-10

## 2020-08-05 RX ORDER — ALBUMIN, HUMAN INJ 5% 5 %
250 SOLUTION INTRAVENOUS ONCE AS NEEDED
Status: DISCONTINUED | OUTPATIENT
Start: 2020-08-05 | End: 2020-08-11 | Stop reason: ALTCHOICE

## 2020-08-05 RX ORDER — NITROGLYCERIN 20 MG/100ML
INJECTION INTRAVENOUS CONTINUOUS PRN
Status: DISCONTINUED | OUTPATIENT
Start: 2020-08-05 | End: 2020-08-05 | Stop reason: SURG

## 2020-08-05 RX ORDER — ASPIRIN 325 MG
325 TABLET, DELAYED RELEASE (ENTERIC COATED) ORAL DAILY
Status: DISCONTINUED | OUTPATIENT
Start: 2020-08-05 | End: 2020-08-12

## 2020-08-05 RX ORDER — DOCUSATE SODIUM 100 MG/1
100 CAPSULE, LIQUID FILLED ORAL 2 TIMES DAILY
Status: DISCONTINUED | OUTPATIENT
Start: 2020-08-05 | End: 2020-08-12

## 2020-08-05 RX ORDER — IPRATROPIUM BROMIDE AND ALBUTEROL SULFATE 2.5; .5 MG/3ML; MG/3ML
3 SOLUTION RESPIRATORY (INHALATION) EVERY 4 HOURS PRN
Status: DISCONTINUED | OUTPATIENT
Start: 2020-08-05 | End: 2020-08-10

## 2020-08-05 RX ORDER — DEXTROSE MONOHYDRATE 25 G/50ML
50 INJECTION, SOLUTION INTRAVENOUS
Status: DISCONTINUED | OUTPATIENT
Start: 2020-08-05 | End: 2020-08-12

## 2020-08-05 RX ORDER — DEXMEDETOMIDINE HYDROCHLORIDE 4 UG/ML
INJECTION, SOLUTION INTRAVENOUS CONTINUOUS
Status: DISCONTINUED | OUTPATIENT
Start: 2020-08-05 | End: 2020-08-07

## 2020-08-05 RX ORDER — DEXTROSE AND SODIUM CHLORIDE 5; .45 G/100ML; G/100ML
INJECTION, SOLUTION INTRAVENOUS CONTINUOUS
Status: DISCONTINUED | OUTPATIENT
Start: 2020-08-05 | End: 2020-08-10

## 2020-08-05 RX ADMIN — FAMOTIDINE 20 MG: 10 INJECTION, SOLUTION INTRAVENOUS at 06:38:00

## 2020-08-05 RX ADMIN — METHYLPREDNISOLONE SODIUM SUCCINATE 500 MG: 500 INJECTION, POWDER, FOR SOLUTION INTRAMUSCULAR; INTRAVENOUS at 07:21:00

## 2020-08-05 RX ADMIN — ACETAMINOPHEN 1000 MG: 10 INJECTION, SOLUTION INTRAVENOUS at 09:46:00

## 2020-08-05 RX ADMIN — SODIUM CHLORIDE: 9 INJECTION, SOLUTION INTRAVENOUS at 06:36:00

## 2020-08-05 RX ADMIN — MIDAZOLAM HYDROCHLORIDE 2 MG: 1 INJECTION INTRAMUSCULAR; INTRAVENOUS at 07:54:00

## 2020-08-05 RX ADMIN — METOCLOPRAMIDE HYDROCHLORIDE 10 MG: 5 INJECTION INTRAMUSCULAR; INTRAVENOUS at 09:36:00

## 2020-08-05 RX ADMIN — ROCURONIUM BROMIDE 50 MG: 10 INJECTION, SOLUTION INTRAVENOUS at 09:04:00

## 2020-08-05 RX ADMIN — LEVOFLOXACIN 500 MG: 5 INJECTION, SOLUTION INTRAVENOUS at 07:15:00

## 2020-08-05 RX ADMIN — NITROGLYCERIN 50 MCG/MIN: 20 INJECTION INTRAVENOUS at 09:35:00

## 2020-08-05 RX ADMIN — ROCURONIUM BROMIDE 50 MG: 10 INJECTION, SOLUTION INTRAVENOUS at 07:54:00

## 2020-08-05 RX ADMIN — NITROGLYCERIN 15 MCG/MIN: 20 INJECTION INTRAVENOUS at 09:37:00

## 2020-08-05 RX ADMIN — SODIUM CHLORIDE: 9 INJECTION, SOLUTION INTRAVENOUS at 08:03:00

## 2020-08-05 RX ADMIN — VANCOMYCIN HYDROCHLORIDE 1000 MG: 1 INJECTION, POWDER, LYOPHILIZED, FOR SOLUTION INTRAVENOUS at 06:50:00

## 2020-08-05 RX ADMIN — DOBUTAMINE HYDROCHLORIDE 3 MCG/KG/MIN: 200 INJECTION INTRAVENOUS at 07:27:00

## 2020-08-05 RX ADMIN — MIDAZOLAM HYDROCHLORIDE 5 MG: 1 INJECTION INTRAMUSCULAR; INTRAVENOUS at 06:40:00

## 2020-08-05 RX ADMIN — DEXMEDETOMIDINE HYDROCHLORIDE 0.8 MCG/KG/HR: 4 INJECTION, SOLUTION INTRAVENOUS at 08:05:00

## 2020-08-05 RX ADMIN — ROCURONIUM BROMIDE 100 MG: 10 INJECTION, SOLUTION INTRAVENOUS at 06:44:00

## 2020-08-05 RX ADMIN — MIDAZOLAM HYDROCHLORIDE 3 MG: 1 INJECTION INTRAMUSCULAR; INTRAVENOUS at 09:04:00

## 2020-08-05 RX ADMIN — DIPHENHYDRAMINE HYDROCHLORIDE 50 MG: 50 INJECTION INTRAMUSCULAR; INTRAVENOUS at 06:38:00

## 2020-08-05 RX ADMIN — PROTAMINE SULFATE 10 MG: 10 INJECTION, SOLUTION INTRAVENOUS at 09:34:00

## 2020-08-05 RX ADMIN — DEXMEDETOMIDINE HYDROCHLORIDE 1 MCG/KG/HR: 4 INJECTION, SOLUTION INTRAVENOUS at 09:50:00

## 2020-08-05 RX ADMIN — SODIUM CHLORIDE: 9 INJECTION, SOLUTION INTRAVENOUS at 11:28:00

## 2020-08-05 RX ADMIN — DOBUTAMINE HYDROCHLORIDE 3 MCG/KG/MIN: 200 INJECTION INTRAVENOUS at 09:23:00

## 2020-08-05 RX ADMIN — NITROGLYCERIN 5 MCG/MIN: 20 INJECTION INTRAVENOUS at 06:36:00

## 2020-08-05 RX ADMIN — LIDOCAINE HYDROCHLORIDE 50 MG: 10 INJECTION, SOLUTION EPIDURAL; INFILTRATION; INTRACAUDAL; PERINEURAL at 06:44:00

## 2020-08-05 RX ADMIN — HEPARIN SODIUM 34000 UNITS: 1000 INJECTION, SOLUTION INTRAVENOUS; SUBCUTANEOUS at 07:56:00

## 2020-08-05 RX ADMIN — ALBUTEROL SULFATE 2 PUFF: 90 AEROSOL, METERED RESPIRATORY (INHALATION) at 09:28:00

## 2020-08-05 RX ADMIN — PROTAMINE SULFATE 420 MG: 10 INJECTION, SOLUTION INTRAVENOUS at 09:35:00

## 2020-08-05 RX ADMIN — SODIUM CHLORIDE: 9 INJECTION, SOLUTION INTRAVENOUS at 08:40:00

## 2020-08-05 RX ADMIN — DOBUTAMINE HYDROCHLORIDE 5 MCG/KG/MIN: 200 INJECTION INTRAVENOUS at 09:59:00

## 2020-08-05 NOTE — ANESTHESIA PROCEDURE NOTES
Procedure Performed: DAVID       Start Time:  8/5/2020 7:15 AM       End Time:   8/5/2020 10:54 AM    Preanesthesia Checklist:  Patient identified, IV assessed, risks and benefits discussed, monitors and equipment assessed, procedure being performed at surge Leaflets normal.  Leaflet motions normal.    Pulmonic Valve: Annulus normal.  Stenosis not present. Regurgitation +1. Aorta    Ascending Aorta:  Size dilated. Diameter 3.95 cm. Dissection not present. Plaque thickness less than 3 mm.   Mobile

## 2020-08-05 NOTE — ANESTHESIA PROCEDURE NOTES
Central Line  Performed by: Avelino Boss MD  Authorized by: Avelino Boss MD     General Information and Staff    Procedure Start:  8/5/2020 6:50 AM  Procedure End:  8/5/2020 7:10 AM  Anesthesiologist:  Avelino Boss MD  Performed by:   Anesthesiologist

## 2020-08-05 NOTE — CONSULTS
[facility]  Consultation Note      Prudy Grad Patient Status:  Inpatient    1950 MRN [id number]   [facility] 6NE-A Attending Lucinda Goode MD   Hosp Day # 0 PCP Rlaph Spencer     Outpatient cardiologist: solution 15 mL, 15 mL, Mouth/Throat, [email or web address]  Midazolam HCl (VERSED) 2 MG/2ML injection 1 mg, 1 mg, Intravenous, Q30 Min PRN  propofol (DIPRIVAN) infusion, 5-100 mcg/kg/min, Intravenous, Continuous  glucose (DEX4) oral liquid 15 g, 15 g, Oral, Q15 Mi chloride IVPB premix 20 mEq, 20 mEq, Intravenous, PRN    Or  potassium chloride IVPB premix 40 mEq, 40 mEq, Intravenous, PRN  calcium gluconate 3 g in sodium chloride 0.9% 100 mL IVPB, 3 g, Intravenous, PRN  Magnesium Sulfate in D5W IVPB premix 1 g, 1 g, I gall bladder removal 2017   • RETINAL LASER PROCEDURE      left eye retinal surgery   • TONSILLECTOMY         Family History  Family history is unknown by patient. Social History   reports that he has never smoked.  He has never used smokeless tobacc clubbing or cyanosis; moves all 4 extremities normally  Psychiatric: Normal mood and affect; answers questions appropriately  Dermatologic: No rashes; normal skin turgor    Diagnostic testing:    EKG: AP/VS rhythm.     Labs:   Lab Results   Component Value

## 2020-08-05 NOTE — ANESTHESIA PROCEDURE NOTES
Arterial Line  Performed by: Padilla Pulido MD  Authorized by: Padilla Pulido MD     General Information and Staff    Procedure Start:  8/5/2020 6:42 AM  Procedure End:  8/5/2020 6:45 AM  Anesthesiologist:  Padilla Pulido MD  Performed By:  Anesthesiolog

## 2020-08-05 NOTE — ANESTHESIA PREPROCEDURE EVALUATION
PRE-OP EVALUATION    Patient Name: Hali Dixon    Pre-op Diagnosis: aortic insufficiency    Procedure(s):  aortic valve replacement, amputate left atrial appendage    Surgeon(s) and Role:     * Lakeisha Esqueda MD - Primary    Pre-op vitals reviewed.   Temp: KLOR-CON M20 20 MEQ Oral Tab CR, Take 20 mEq by mouth once daily. , Disp: , Rfl: 0  STIOLTO RESPIMAT 2.5-2.5 MCG/ACT Inhalation Aero Soln, 2 (two) times daily. , Disp: , Rfl:   aspirin 325 MG Oral Tab EC, Take 325 mg by mouth daily. , Disp: , Rfl:   Loratad ejection  fraction is 55-60%. Wall motion is normal; there are no regional wall motion  abnormalities. Left ventricular diastolic function parameters are normal.  Right ventricle:   The cavity size is normal. Wall thickness is normal. The  moderator band is surgery   • OTHER SURGICAL HISTORY      scoped knees   • OTHER SURGICAL HISTORY      broken hand   • OTHER SURGICAL HISTORY      gall bladder removal 2017   • RETINAL LASER PROCEDURE      left eye retinal surgery   • TONSILLECTOMY       Social History    T discussed with: patient  Use of blood product(s) discussed with: patient              Present on Admission:  **None**

## 2020-08-05 NOTE — ANESTHESIA PROCEDURE NOTES
Airway  Date/Time: 8/5/2020 6:47 AM  Urgency: elective      General Information and Staff    Patient location during procedure: OR  Anesthesiologist: Sarah Jung MD  Performed: anesthesiologist     Indications and Patient Condition  Indications for airw

## 2020-08-05 NOTE — CONSULTS
General Medicine Consult      Consulted by:  Evy Crockett MD    PCP: Radha Salazar    Reason for Consultation: Medical Management    History of Present Illness: Patient is a 78year old male with PMH including but not limited to asthma, DM, afib, SVT Hives  Sulfa Antibiotics           Comment:Other reaction(s): Hives  Sulfamethoxazole W/*    OTHER (SEE COMMENTS)    Comment:Childhood hives     Soc Hx:  Social History    Tobacco Use      Smoking status: Never Smoker      Smokeless tobacco: Never Used performed using Consensus Panel categories and NASCET criteria  PATIENT STATED HISTORY: (As transcribed by Technologist)  Patient presents for evaluation of his carotid. He is going to have surgery next month.     FINDINGS:   IMAGE FINDINGS:  No significant perihilar and left lower lobe atelectasis with small left effusion.      Dictated by (CST): Christine Solares MD on 8/05/2020 at 12:57 PM     Finalized by (CST): Christine Solares MD on 8/05/2020 at 12:57 PM       Xr Chest Ap Portable  (cpt=71045)    Resu

## 2020-08-05 NOTE — DIETARY NOTE
Clinical Nutrition Note    Dietitian consult received per cardiac rehab/CHF protocol. Pt to be educated by cardiac rehab staff and encouraged to attend outpatient classes taught by GERMAN. GERMAN available PRN.     Montserrat Mckinley RD, [address] [facility]  Pager #[phone]

## 2020-08-05 NOTE — ANESTHESIA POSTPROCEDURE EVALUATION
[address] [facility] Patient Status:  Inpatient   Age/Gender 78year old male MRN [id number]   [facility] 6NE-A Attending Jennifer Talavera MD   Hosp Day # 0 PCP ENDY SHERMAN       Anesthesia Post-op Note    Procedure(s):   Miguel Izaguirre

## 2020-08-05 NOTE — CONSULTS
[facility]    PATIENT'S NAME: [email or web address], CLAUDE   ATTENDING PHYSICIAN: Yolanda Adame MD   CONSULTING PHYSICIAN: Miguelina Brizuela M.D.    PATIENT ACCOUNT#:   [de-identified]    LOCATION:  [address] [place] [address]  MEDICAL RECORD #:   [id number]       DATE OF BIRTH:  [birth year] to be obtained as patient currently intubated. PHYSICAL EXAMINATION:    GENERAL:  Intubated, sedated. VITAL SIGNS:  Temp is 99.8, pulse is 83, respirations 17, blood pressure 92/76, O2 saturation 98%. HEENT:  Unremarkable. ET tube in place.   NECK:

## 2020-08-06 ENCOUNTER — APPOINTMENT (OUTPATIENT)
Dept: GENERAL RADIOLOGY | Facility: HOSPITAL | Age: 70
DRG: 220 | End: 2020-08-06
Attending: THORACIC SURGERY (CARDIOTHORACIC VASCULAR SURGERY)
Payer: MEDICARE

## 2020-08-06 LAB
BASOPHILS # BLD AUTO: 0.01 X10(3) UL (ref 0–0.2)
BASOPHILS NFR BLD AUTO: 0.1 %
BUN BLD-MCNC: 24 MG/DL (ref 7–18)
CALCIUM BLD-MCNC: 8 MG/DL (ref 8.5–10.1)
CHLORIDE SERPL-SCNC: 106 MMOL/L (ref 98–112)
CO2 SERPL-SCNC: 24 MMOL/L (ref 21–32)
CREAT BLD-MCNC: 1.04 MG/DL (ref 0.7–1.3)
DEPRECATED RDW RBC AUTO: 40.5 FL (ref 35.1–46.3)
EOSINOPHIL # BLD AUTO: 0 X10(3) UL (ref 0–0.7)
EOSINOPHIL NFR BLD AUTO: 0 %
ERYTHROCYTE [DISTWIDTH] IN BLOOD BY AUTOMATED COUNT: 12.2 % (ref 11–15)
GLUCOSE BLD-MCNC: 102 MG/DL (ref 70–99)
GLUCOSE BLD-MCNC: 110 MG/DL (ref 70–99)
GLUCOSE BLD-MCNC: 116 MG/DL (ref 70–99)
GLUCOSE BLD-MCNC: 117 MG/DL (ref 70–99)
GLUCOSE BLD-MCNC: 120 MG/DL (ref 70–99)
GLUCOSE BLD-MCNC: 122 MG/DL (ref 70–99)
GLUCOSE BLD-MCNC: 125 MG/DL (ref 70–99)
GLUCOSE BLD-MCNC: 142 MG/DL (ref 70–99)
GLUCOSE BLD-MCNC: 158 MG/DL (ref 70–99)
GLUCOSE BLD-MCNC: 332 MG/DL (ref 70–99)
GLUCOSE BLD-MCNC: 396 MG/DL (ref 70–99)
GLUCOSE BLD-MCNC: 402 MG/DL (ref 70–99)
GLUCOSE BLD-MCNC: 403 MG/DL (ref 70–99)
GLUCOSE BLD-MCNC: 404 MG/DL (ref 70–99)
GLUCOSE BLD-MCNC: 421 MG/DL (ref 70–99)
GLUCOSE BLD-MCNC: 95 MG/DL (ref 70–99)
HAV IGM SER QL: 1.7 MG/DL (ref 1.6–2.6)
HCT VFR BLD AUTO: 30.5 % (ref 39–53)
HEPARIN INDUCED PLT ANTIBODY: NEGATIVE
HGB BLD-MCNC: 10.3 G/DL (ref 13–17.5)
IMM GRANULOCYTES # BLD AUTO: 0.05 X10(3) UL (ref 0–1)
IMM GRANULOCYTES NFR BLD: 0.4 %
LYMPHOCYTES # BLD AUTO: 0.72 X10(3) UL (ref 1–4)
LYMPHOCYTES NFR BLD AUTO: 5.2 %
MCH RBC QN AUTO: 30.8 PG (ref 26–34)
MCHC RBC AUTO-ENTMCNC: 33.8 G/DL (ref 31–37)
MCV RBC AUTO: 91.3 FL (ref 80–100)
MONOCYTES # BLD AUTO: 1.05 X10(3) UL (ref 0.1–1)
MONOCYTES NFR BLD AUTO: 7.6 %
NEUTROPHILS # BLD AUTO: 12.07 X10 (3) UL (ref 1.5–7.7)
NEUTROPHILS # BLD AUTO: 12.07 X10(3) UL (ref 1.5–7.7)
NEUTROPHILS NFR BLD AUTO: 86.7 %
PLATELET # BLD AUTO: 99 10(3)UL (ref 150–450)
POTASSIUM SERPL-SCNC: 4.2 MMOL/L (ref 3.5–5.1)
RBC # BLD AUTO: 3.34 X10(6)UL (ref 3.8–5.8)
SODIUM SERPL-SCNC: 133 MMOL/L (ref 136–145)
WBC # BLD AUTO: 13.9 X10(3) UL (ref 4–11)

## 2020-08-06 PROCEDURE — 97166 OT EVAL MOD COMPLEX 45 MIN: CPT

## 2020-08-06 PROCEDURE — 80048 BASIC METABOLIC PNL TOTAL CA: CPT | Performed by: THORACIC SURGERY (CARDIOTHORACIC VASCULAR SURGERY)

## 2020-08-06 PROCEDURE — 82962 GLUCOSE BLOOD TEST: CPT

## 2020-08-06 PROCEDURE — 97162 PT EVAL MOD COMPLEX 30 MIN: CPT

## 2020-08-06 PROCEDURE — 97530 THERAPEUTIC ACTIVITIES: CPT

## 2020-08-06 PROCEDURE — 93010 ELECTROCARDIOGRAM REPORT: CPT | Performed by: INTERNAL MEDICINE

## 2020-08-06 PROCEDURE — 97116 GAIT TRAINING THERAPY: CPT

## 2020-08-06 PROCEDURE — 85025 COMPLETE CBC W/AUTO DIFF WBC: CPT | Performed by: THORACIC SURGERY (CARDIOTHORACIC VASCULAR SURGERY)

## 2020-08-06 PROCEDURE — 71045 X-RAY EXAM CHEST 1 VIEW: CPT | Performed by: THORACIC SURGERY (CARDIOTHORACIC VASCULAR SURGERY)

## 2020-08-06 PROCEDURE — 93005 ELECTROCARDIOGRAM TRACING: CPT

## 2020-08-06 PROCEDURE — 83735 ASSAY OF MAGNESIUM: CPT | Performed by: THORACIC SURGERY (CARDIOTHORACIC VASCULAR SURGERY)

## 2020-08-06 PROCEDURE — 86022 PLATELET ANTIBODIES: CPT | Performed by: NURSE PRACTITIONER

## 2020-08-06 RX ORDER — MAGNESIUM OXIDE 400 MG (241.3 MG MAGNESIUM) TABLET
400 TABLET ONCE
Status: COMPLETED | OUTPATIENT
Start: 2020-08-06 | End: 2020-08-06

## 2020-08-06 RX ORDER — FUROSEMIDE 10 MG/ML
40 INJECTION INTRAMUSCULAR; INTRAVENOUS
Status: DISCONTINUED | OUTPATIENT
Start: 2020-08-06 | End: 2020-08-07

## 2020-08-06 RX ORDER — METOCLOPRAMIDE HYDROCHLORIDE 5 MG/ML
5 INJECTION INTRAMUSCULAR; INTRAVENOUS EVERY 6 HOURS PRN
Status: DISCONTINUED | OUTPATIENT
Start: 2020-08-06 | End: 2020-08-12

## 2020-08-06 NOTE — H&P
EMILE Yung Lay:     Arina Helton  [address]                                                   :  1950  [address] [place] [address] , [address]     Dear Agustín Hoyt:       I saw our mutual patient, Mr. Marycruz Miranda

## 2020-08-06 NOTE — OPERATIVE REPORT
[facility]    PATIENT'S NAME: Yuliana Valdez   ATTENDING PHYSICIAN: Yolanda Adame MD   OPERATING PHYSICIAN: Yolanda Adame MD   PATIENT ACCOUNT#:   [id number]    LOCATION:  [address] [place] [address]  MEDICAL RECORD #:   [id number]       YOB: 1950  ADM aorta was crossclamped. Cardioplegia was given retrograde while the aorta was opened. After opening the aorta, cardioplegia was additionally given directly via the right and left main coronary ostia to achieve electromechanical arrest of the heart.   Miranda Glasgow to the heart. Chest was closed in the usual fashion. The patient was taken to the ICU in stable condition. The patient's family was updated by me regarding the patient's condition and the conduct of the operation.     Dictated By Jordon Negro MD  d: 08/05/

## 2020-08-06 NOTE — PROGRESS NOTES
[facility]  Progress Note    Chau Pod Patient Status:  Inpatient    1950 MRN [id number]   [facility] 6NE-A Attending Pari Billings MD   Hosp Day # 1 PCP ENDY SHERMAN     Assessment/Plan:  Patient Active Problem List: 08/05/20  1130  08/06/20  0409  08/06/20  0706     --  133*  --   --    K 4.2  --  4.2  --   --      --  106  --   --    CO2 26.0  --  24.0  --   --    BUN 24*  --  24*  --   --    CREATSERUM 1.02  --  1.04  --   --    PGLU  --    < >  --    <

## 2020-08-06 NOTE — OCCUPATIONAL THERAPY NOTE
OCCUPATIONAL THERAPY EVALUATION - INPATIENT     Room Number: 4251/8040-D  Evaluation Date: 8/6/2020  Type of Evaluation: Initial  Presenting Problem: AVR    Physician Order: IP Consult to Occupational Therapy  Reason for Therapy: ADL/IADL Dysfunction and D AD.    SUBJECTIVE   Pt stated, \"I can be very forgetful. \"    Patient self-stated goal is to go home     OBJECTIVE  Precautions: Sternal;Cardiac  Fall Risk: High fall risk    WEIGHT BEARING RESTRICTION  Weight Bearing Restriction: None                PAIN Therapy Provided: PPE worn: surgical mask, gloves.  Pt was received up in chair for session, therapist educated pt on sternal precautions, therapist completed MMT and ROM on pt within sternal precautions, pt was able to amb x1 in room and hallway with min a education; Neuromuscluar reeducation;Equipment eval/education;Patient/Family training;Patient/Family education; Endurance training;UE strengthening/ROM; Functional transfer training  Rehab Potential : Good  Frequency (Obs): 3-5x/week  Number of Visits to Meet

## 2020-08-06 NOTE — PHYSICAL THERAPY NOTE
PHYSICAL THERAPY EVALUATION - INPATIENT     Room Number: 7893/5607-H  Evaluation Date: 8/6/2020  Type of Evaluation: Initial  Physician Order: PT Eval and Treat    Presenting Problem: S/p AVR on 8/5/20  Reason for Therapy: Mobility Dysfunction and Conda Jose who uses electric scoot for longer distances. SUBJECTIVE  \"What is my objective on this thing? \" - the incentive spirometer.     Patient self-stated goal is - none stated this session    OBJECTIVE  Precautions: Sternal;Cardiac  Fall Risk: High fall ri AM-PAC Score:  Raw Score: 17   Approx Degree of Impairment: 50.57%   Standardized Score (AM-PAC Scale): 42.13   CMS Modifier (G-Code): CK    FUNCTIONAL ABILITY STATUS  Gait Assessment   Gait Assistance: Minimum assistance  Distance (ft): 50  Assistiv function. DISCHARGE RECOMMENDATIONS  PT Discharge Recommendations: Home with home health PT    PLAN  PT Treatment Plan: Bed mobility; Body mechanics; Patient education; Family education;Gait training;Strengthening;Transfer training;Balance training  Rehab Po

## 2020-08-06 NOTE — PROGRESS NOTES
08/05/20 1519   Vent Information   $ RT Standby Charge (per 15 min) 1   Ventilator Initiation 08/05/20   Ventilation Day(s) 1   Interface Invasive   Vent Type PB   Vent ID 6   Vent Mode VC+   Settings   FiO2 (%) 50 %   Resp Rate (Set) 14   Vt (Set, mL)

## 2020-08-06 NOTE — PLAN OF CARE
Assumed care at [address]. Pt. Alert and oriented, using PCA with pain relief, also medicating with ofirmev. Pt. On nitro and insulin gtts. Dobutamine weaned off at 0500. BP goal . SR 1st degree HB 80s'-90's, C.I. 1.9-2.4, SVO2 mid 70s.  CVP initially 9

## 2020-08-06 NOTE — PROGRESS NOTES
[facility] Hospitalist Progress Note                                                                   [address] [facility]  6/20/1950    SUBJECTIVE:  Pt seen and examined.   Doing well, sitting up in [place] mg Oral BID   • mupirocin  1 Application Nasal BID   • pantoprazole (PROTONIX) IV push  40 mg Intravenous QAM AC    Or   • Pantoprazole Sodium  40 mg Oral QAM AC   • Flecainide Acetate  150 mg Oral BID     Continuous Infusions:   • HYDROmorphone     • dexm agree.      Cesia Myers DO  [facility] Hospitalist  Pager: [phone]

## 2020-08-06 NOTE — PROGRESS NOTES
[facility]  CV Surgery Progress Note    Ben Thomas Patient Status:  Inpatient    1950 MRN [id number]   [facility] 6NE-A Attending Ashley Pinto MD   Hosp Day # 1 PCP ENDY SHERMAN     Subjective:  Patient seen sitting up atelectasis left base, decreasing vascular   congestion. No sign of acute CHF. No new or worsening process.       Assessment/Plan:   # POD 1 s/p AVR, ANDREW ligation, PVI   -HD stable, remove lesia, swan/cordis, gar   -low grade temp yesterday, mild leukoc

## 2020-08-06 NOTE — CM/SW NOTE
08/06/20 1200   CM/SW Referral Data   Referral Source    Reason for Referral Discharge planning   Informant Patient;Spouse  (son)   Patient Info   Patient's Mental Status Alert;Oriented   Patient's [address]   Number of Levels i

## 2020-08-07 ENCOUNTER — APPOINTMENT (OUTPATIENT)
Dept: GENERAL RADIOLOGY | Facility: HOSPITAL | Age: 70
DRG: 220 | End: 2020-08-07
Attending: THORACIC SURGERY (CARDIOTHORACIC VASCULAR SURGERY)
Payer: MEDICARE

## 2020-08-07 ENCOUNTER — APPOINTMENT (OUTPATIENT)
Dept: ULTRASOUND IMAGING | Facility: HOSPITAL | Age: 70
DRG: 220 | End: 2020-08-07
Attending: INTERNAL MEDICINE
Payer: MEDICARE

## 2020-08-07 LAB
ANION GAP SERPL CALC-SCNC: 4 MMOL/L (ref 0–18)
ANION GAP SERPL CALC-SCNC: 5 MMOL/L (ref 0–18)
ANION GAP SERPL CALC-SCNC: 6 MMOL/L (ref 0–18)
ATRIAL RATE: 56 BPM
ATRIAL RATE: 73 BPM
BASOPHILS # BLD AUTO: 0.01 X10(3) UL (ref 0–0.2)
BASOPHILS # BLD AUTO: 0.02 X10(3) UL (ref 0–0.2)
BASOPHILS NFR BLD AUTO: 0.1 %
BASOPHILS NFR BLD AUTO: 0.1 %
BILIRUB UR QL STRIP.AUTO: NEGATIVE
BUN BLD-MCNC: 42 MG/DL (ref 7–18)
BUN BLD-MCNC: 43 MG/DL (ref 7–18)
BUN BLD-MCNC: 46 MG/DL (ref 7–18)
BUN/CREAT SERPL: 21.4 (ref 10–20)
BUN/CREAT SERPL: 23.2 (ref 10–20)
BUN/CREAT SERPL: 25.7 (ref 10–20)
CALCIUM BLD-MCNC: 7.7 MG/DL (ref 8.5–10.1)
CALCIUM BLD-MCNC: 7.9 MG/DL (ref 8.5–10.1)
CALCIUM BLD-MCNC: 8.2 MG/DL (ref 8.5–10.1)
CHLORIDE SERPL-SCNC: 94 MMOL/L (ref 98–112)
CHLORIDE SERPL-SCNC: 95 MMOL/L (ref 98–112)
CHLORIDE SERPL-SCNC: 96 MMOL/L (ref 98–112)
CO2 SERPL-SCNC: 24 MMOL/L (ref 21–32)
COLOR UR AUTO: YELLOW
CREAT BLD-MCNC: 1.67 MG/DL (ref 0.7–1.3)
CREAT BLD-MCNC: 1.96 MG/DL (ref 0.7–1.3)
CREAT BLD-MCNC: 1.98 MG/DL (ref 0.7–1.3)
CREAT UR-SCNC: 159 MG/DL
DEPRECATED RDW RBC AUTO: 42.5 FL (ref 35.1–46.3)
DEPRECATED RDW RBC AUTO: 42.9 FL (ref 35.1–46.3)
EOSINOPHIL # BLD AUTO: 0 X10(3) UL (ref 0–0.7)
EOSINOPHIL # BLD AUTO: 0 X10(3) UL (ref 0–0.7)
EOSINOPHIL NFR BLD AUTO: 0 %
EOSINOPHIL NFR BLD AUTO: 0 %
ERYTHROCYTE [DISTWIDTH] IN BLOOD BY AUTOMATED COUNT: 12.3 % (ref 11–15)
ERYTHROCYTE [DISTWIDTH] IN BLOOD BY AUTOMATED COUNT: 12.3 % (ref 11–15)
GLUCOSE BLD-MCNC: 245 MG/DL (ref 70–99)
GLUCOSE BLD-MCNC: 257 MG/DL (ref 70–99)
GLUCOSE BLD-MCNC: 295 MG/DL (ref 70–99)
GLUCOSE BLD-MCNC: 340 MG/DL (ref 70–99)
GLUCOSE BLD-MCNC: 354 MG/DL (ref 70–99)
GLUCOSE BLD-MCNC: 379 MG/DL (ref 70–99)
GLUCOSE BLD-MCNC: 414 MG/DL (ref 70–99)
GLUCOSE BLD-MCNC: 422 MG/DL (ref 70–99)
GLUCOSE UR STRIP.AUTO-MCNC: 50 MG/DL
HAV IGM SER QL: 2.1 MG/DL (ref 1.6–2.6)
HCT VFR BLD AUTO: 28.3 % (ref 39–53)
HCT VFR BLD AUTO: 29.1 % (ref 39–53)
HGB BLD-MCNC: 9.6 G/DL (ref 13–17.5)
HGB BLD-MCNC: 9.7 G/DL (ref 13–17.5)
HYALINE CASTS #/AREA URNS AUTO: PRESENT /LPF
IMM GRANULOCYTES # BLD AUTO: 0.15 X10(3) UL (ref 0–1)
IMM GRANULOCYTES # BLD AUTO: 0.16 X10(3) UL (ref 0–1)
IMM GRANULOCYTES NFR BLD: 0.8 %
IMM GRANULOCYTES NFR BLD: 0.8 %
KETONES UR STRIP.AUTO-MCNC: NEGATIVE MG/DL
LEUKOCYTE ESTERASE UR QL STRIP.AUTO: NEGATIVE
LYMPHOCYTES # BLD AUTO: 1.22 X10(3) UL (ref 1–4)
LYMPHOCYTES # BLD AUTO: 1.51 X10(3) UL (ref 1–4)
LYMPHOCYTES NFR BLD AUTO: 6.6 %
LYMPHOCYTES NFR BLD AUTO: 7.8 %
MCH RBC QN AUTO: 31.8 PG (ref 26–34)
MCH RBC QN AUTO: 32 PG (ref 26–34)
MCHC RBC AUTO-ENTMCNC: 33.3 G/DL (ref 31–37)
MCHC RBC AUTO-ENTMCNC: 33.9 G/DL (ref 31–37)
MCV RBC AUTO: 94.3 FL (ref 80–100)
MCV RBC AUTO: 95.4 FL (ref 80–100)
MONOCYTES # BLD AUTO: 2.16 X10(3) UL (ref 0.1–1)
MONOCYTES # BLD AUTO: 2.22 X10(3) UL (ref 0.1–1)
MONOCYTES NFR BLD AUTO: 11.5 %
MONOCYTES NFR BLD AUTO: 11.6 %
NEUTROPHILS # BLD AUTO: 15.02 X10 (3) UL (ref 1.5–7.7)
NEUTROPHILS # BLD AUTO: 15.02 X10(3) UL (ref 1.5–7.7)
NEUTROPHILS # BLD AUTO: 15.42 X10 (3) UL (ref 1.5–7.7)
NEUTROPHILS # BLD AUTO: 15.42 X10(3) UL (ref 1.5–7.7)
NEUTROPHILS NFR BLD AUTO: 79.8 %
NEUTROPHILS NFR BLD AUTO: 80.9 %
NITRITE UR QL STRIP.AUTO: NEGATIVE
OSMOLALITY SERPL CALC.SUM OF ELEC: 277 MOSM/KG (ref 275–295)
OSMOLALITY SERPL CALC.SUM OF ELEC: 277 MOSM/KG (ref 275–295)
OSMOLALITY SERPL CALC.SUM OF ELEC: 285 MOSM/KG (ref 275–295)
OSMOLALITY UR: 396 MOSM/KG (ref 300–1300)
P AXIS: 65 DEGREES
P-R INTERVAL: 214 MS
PH UR STRIP.AUTO: 5 [PH] (ref 4.5–8)
PLATELET # BLD AUTO: 96 10(3)UL (ref 150–450)
PLATELET # BLD AUTO: 97 10(3)UL (ref 150–450)
POTASSIUM SERPL-SCNC: 4.8 MMOL/L (ref 3.5–5.1)
POTASSIUM SERPL-SCNC: 5.4 MMOL/L (ref 3.5–5.1)
POTASSIUM SERPL-SCNC: 5.4 MMOL/L (ref 3.5–5.1)
PROT UR STRIP.AUTO-MCNC: NEGATIVE MG/DL
Q-T INTERVAL: 384 MS
Q-T INTERVAL: 410 MS
QRS DURATION: 110 MS
QRS DURATION: 96 MS
QTC CALCULATION (BEZET): 420 MS
QTC CALCULATION (BEZET): 451 MS
R AXIS: -16 DEGREES
R AXIS: -19 DEGREES
RBC # BLD AUTO: 3 X10(6)UL (ref 3.8–5.8)
RBC # BLD AUTO: 3.05 X10(6)UL (ref 3.8–5.8)
RBC #/AREA URNS AUTO: >10 /HPF
SODIUM SERPL-SCNC: 123 MMOL/L (ref 136–145)
SODIUM SERPL-SCNC: 124 MMOL/L (ref 136–145)
SODIUM SERPL-SCNC: 125 MMOL/L (ref 136–145)
SODIUM SERPL-SCNC: 50 MMOL/L
SP GR UR STRIP.AUTO: 1.02 (ref 1–1.03)
T AXIS: 31 DEGREES
T AXIS: 35 DEGREES
UROBILINOGEN UR STRIP.AUTO-MCNC: <2 MG/DL
UUN UR-MCNC: 424 MG/DL
VENTRICULAR RATE: 72 BPM
VENTRICULAR RATE: 73 BPM
WBC # BLD AUTO: 18.6 X10(3) UL (ref 4–11)
WBC # BLD AUTO: 19.3 X10(3) UL (ref 4–11)

## 2020-08-07 PROCEDURE — 97116 GAIT TRAINING THERAPY: CPT

## 2020-08-07 PROCEDURE — 80048 BASIC METABOLIC PNL TOTAL CA: CPT | Performed by: THORACIC SURGERY (CARDIOTHORACIC VASCULAR SURGERY)

## 2020-08-07 PROCEDURE — 80048 BASIC METABOLIC PNL TOTAL CA: CPT | Performed by: INTERNAL MEDICINE

## 2020-08-07 PROCEDURE — P9045 ALBUMIN (HUMAN), 5%, 250 ML: HCPCS | Performed by: NURSE PRACTITIONER

## 2020-08-07 PROCEDURE — 82962 GLUCOSE BLOOD TEST: CPT

## 2020-08-07 PROCEDURE — 76770 US EXAM ABDO BACK WALL COMP: CPT | Performed by: INTERNAL MEDICINE

## 2020-08-07 PROCEDURE — 84540 ASSAY OF URINE/UREA-N: CPT | Performed by: INTERNAL MEDICINE

## 2020-08-07 PROCEDURE — 71045 X-RAY EXAM CHEST 1 VIEW: CPT | Performed by: THORACIC SURGERY (CARDIOTHORACIC VASCULAR SURGERY)

## 2020-08-07 PROCEDURE — 82570 ASSAY OF URINE CREATININE: CPT | Performed by: INTERNAL MEDICINE

## 2020-08-07 PROCEDURE — 85025 COMPLETE CBC W/AUTO DIFF WBC: CPT | Performed by: NURSE PRACTITIONER

## 2020-08-07 PROCEDURE — 84300 ASSAY OF URINE SODIUM: CPT | Performed by: INTERNAL MEDICINE

## 2020-08-07 PROCEDURE — 83935 ASSAY OF URINE OSMOLALITY: CPT | Performed by: INTERNAL MEDICINE

## 2020-08-07 PROCEDURE — 87086 URINE CULTURE/COLONY COUNT: CPT | Performed by: INTERNAL MEDICINE

## 2020-08-07 PROCEDURE — 81001 URINALYSIS AUTO W/SCOPE: CPT | Performed by: INTERNAL MEDICINE

## 2020-08-07 PROCEDURE — 51701 INSERT BLADDER CATHETER: CPT

## 2020-08-07 PROCEDURE — 97530 THERAPEUTIC ACTIVITIES: CPT

## 2020-08-07 PROCEDURE — 94640 AIRWAY INHALATION TREATMENT: CPT

## 2020-08-07 PROCEDURE — 83735 ASSAY OF MAGNESIUM: CPT | Performed by: THORACIC SURGERY (CARDIOTHORACIC VASCULAR SURGERY)

## 2020-08-07 RX ORDER — ALBUTEROL SULFATE 2.5 MG/3ML
2.5 SOLUTION RESPIRATORY (INHALATION) EVERY 4 HOURS PRN
Status: DISCONTINUED | OUTPATIENT
Start: 2020-08-07 | End: 2020-08-12

## 2020-08-07 RX ORDER — ALBUMIN, HUMAN INJ 5% 5 %
250 SOLUTION INTRAVENOUS ONCE
Status: COMPLETED | OUTPATIENT
Start: 2020-08-07 | End: 2020-08-07

## 2020-08-07 RX ORDER — HYDROCODONE BITARTRATE AND ACETAMINOPHEN 10; 325 MG/1; MG/1
1 TABLET ORAL EVERY 6 HOURS PRN
Status: DISCONTINUED | OUTPATIENT
Start: 2020-08-07 | End: 2020-08-07

## 2020-08-07 RX ORDER — HYDROCODONE BITARTRATE AND ACETAMINOPHEN 10; 325 MG/1; MG/1
2 TABLET ORAL EVERY 6 HOURS PRN
Status: DISCONTINUED | OUTPATIENT
Start: 2020-08-07 | End: 2020-08-07

## 2020-08-07 RX ORDER — AMIODARONE HYDROCHLORIDE 200 MG/1
400 TABLET ORAL DAILY
Status: DISCONTINUED | OUTPATIENT
Start: 2020-08-08 | End: 2020-08-07

## 2020-08-07 RX ORDER — OXYCODONE AND ACETAMINOPHEN 10; 325 MG/1; MG/1
1 TABLET ORAL EVERY 6 HOURS PRN
Status: DISCONTINUED | OUTPATIENT
Start: 2020-08-07 | End: 2020-08-12

## 2020-08-07 RX ORDER — AMIODARONE HYDROCHLORIDE 200 MG/1
400 TABLET ORAL 3 TIMES DAILY
Status: DISCONTINUED | OUTPATIENT
Start: 2020-08-07 | End: 2020-08-07

## 2020-08-07 RX ORDER — SODIUM CHLORIDE 9 MG/ML
INJECTION, SOLUTION INTRAVENOUS CONTINUOUS
Status: ACTIVE | OUTPATIENT
Start: 2020-08-07 | End: 2020-08-08

## 2020-08-07 NOTE — DIETARY NOTE
[address]     Admitting diagnosis:  aortic insufficiency  Aortic insufficiency    Ht: 194.3 cm (6' 4.5\")  Wt: 116.1 kg (255 lb 15.3 oz). This is 124 % of IBW  Body mass index is 30.75 kg/m².   IBW: 93.2kg    Wt R

## 2020-08-07 NOTE — PHYSICAL THERAPY NOTE
PHYSICAL THERAPY TREATMENT NOTE - INPATIENT    Room Number: 0640/8439-U     Session: 1   Number of Visits to Meet Established Goals: 3    Presenting Problem: S/p AVR on 8/5/20    History related to current admission: Pt is s/p AVR on 8/5.      PMH: CURT guaman, Static Sitting: Fair  Dynamic Sitting: Fair           Static Standing: Poor +  Dynamic Standing: Poor    ACTIVITY TOLERANCE  Pulse: 57  Heart Rate Source: Monitor  Resp: 20  BP: 104/65(Immediately following gait was 81/40)  BP Location: Left arm was sob. Pt given cues for proper breathing technique, cool cloth placed on forehead. Pt required about 5 min to recover - bp rebounded to 104/65. Pt completed stand pivot to bed w/ mod a of 1.   Pt re-educated to complete sit to supine w/ modified log r

## 2020-08-07 NOTE — PLAN OF CARE
Pt. Went into afib with controlled rate at 2245, EKG done and cardiology notified, will monitor for now. Using dilaudid PCA with good relief. Pt. Having difficulty voiding since gar removed, straight cathed x1. Glucose readings upper 300s-low 400s.   En

## 2020-08-07 NOTE — PLAN OF CARE
Assumed care at 0730. D/C dilaudid PCA. Started P.O. Percocet. Walked in [place] with therapy. Tolerated fairly well. By end of walk pt got dizzy. Sat for a few minutes and recovered. Was able to stand and go back to bed without difficulty.  Dr. Aimee Chinchilla at bedside baseline  Description  INTERVENTIONS:  - Continuous cardiac monitoring, monitor vital signs, obtain 12 lead EKG if indicated  - Evaluate effectiveness of antiarrhythmic and heart rate control medications as ordered  - Initiate emergency measures for life t in ADLs to maximize function  - Promote sitting position while performing ADLs such as feeding, grooming, and bathing  - Educate and encourage patient/family in tolerated functional activity level and precautions during self-care     Outcome: Progressing

## 2020-08-07 NOTE — RESPIRATORY THERAPY NOTE
Pt received this AM with PRN nebulizer treatments ordered. Pt was feeling short of breath this afternoon and requested a treatment. Breath sounds are clear/diminished with oxygen saturations at 99% on 4L NC.  Pt states they take inhaler BID at home and [facility]

## 2020-08-07 NOTE — PROGRESS NOTES
[facility] Hospitalist Progress Note                                                                   [address] [facility]  6/20/1950    SUBJECTIVE:  Pt seen and examined.   He went into a-fib overnight, 1-30 Units Subcutaneous TID CC and HS   • aspirin  325 mg Oral Daily   • atorvastatin  20 mg Oral Nightly   • Fluticasone Propionate  1 spray Nasal Daily   • Montelukast Sodium  10 mg Oral Daily   • docusate sodium  100 mg Oral BID   • mupirocin  1 Applica night, amio po started   -per cards, got flecainide 8/4; holding amio for now  -holding BB; holding ace for now     # Acute blood loss anemia due to surgery  - hgb stable at 9.6  - repeat CBC in AM    # CAD  -asa, statin     # DM2 with hyperglycemia   -end

## 2020-08-07 NOTE — PROGRESS NOTES
[facility]  Progress Note    [facility] Patient Status:  Inpatient    1950 MRN [id number]   [facility] 6NE-A Attending Mikaela Sampson MD   Hosp Day # 2 PCP ENDY SHERMAN       Assessment/Plan: 78year-old gentleman with un Collection Time: 08/06/20  9:30 AM   Result Value Ref Range    Heparin Induced Plt Antibody Negative Negative   POCT GLUCOSE    Collection Time: 08/06/20 11:58 AM   Result Value Ref Range    POC Glucose 332 (H) 70 - 99 mg/dL   POCT GLUCOSE    Collection Ti g/dL    HCT 29.1 (L) 39.0 - 53.0 %    PLT 96.0 (L) 150.0 - 450.0 10(3)uL    MCV 95.4 80.0 - 100.0 fL    MCH 31.8 26.0 - 34.0 pg    MCHC 33.3 31.0 - 37.0 g/dL    RDW 12.3 11.0 - 15.0 %    RDW-SD 42.9 35.1 - 46.3 fL    Neutrophil Absolute Prelim 15.02 (H) 1.

## 2020-08-07 NOTE — PROGRESS NOTES
Anesthesia Post Op Check  Late entry    Patient is s/p AVR, ANDREW amputation, pulmonary vein isolation. POD# 1  Was extubated yesterday evening after successful CPAP trial.  Now on  NC 2L- SpO2=93-95 %. Encouraged incentive spirometry. Pt doing well.  Teresa Palacios

## 2020-08-07 NOTE — PROGRESS NOTES
[facility]  CV Surgery Progress Note    Silvano Reddy Patient Status:  Inpatient    1950 MRN [id number]   [facility] 6NE-A Attending Malachi Long MD   Hosp Day # 2 PCP ENDY SHERMAN     Subjective:  Patient went into afib o Assessment/Plan:   # POD 2 s/p AVR, ANDREW ligation, PVI   -HD stable  -leukocytosis - monitor closely   -mild post op anemia - monitor   -thrombocytopenia - HIPA negative, monitor   -Vol OL - lasix 40mg IV BID, nephrology eval as below   -Pain management

## 2020-08-07 NOTE — CONSULTS
[facility]    Report of Consultation    Adanguillaume Car Patient Status:  Inpatient    1950 MRN [id number]   [facility] 6NE-A Attending Velvet Gruber MD   Hosp Day # 2 PCP ENDY SHERMAN       REASON FOR CONSULT:     BENJAMÍN, electr removal 2017   • RETINAL LASER PROCEDURE      left eye retinal surgery   • TONSILLECTOMY       Family History   Family history unknown: Yes      reports that he has never smoked.  He has never used smokeless tobacco. He reports that he does not drink alcoho MCG/ACT nasal spray 1 spray, 1 spray, Nasal, Daily  •  Montelukast Sodium (SINGULAIR) tab 10 mg, 10 mg, Oral, Daily  •  0.9% NaCl infusion, , Intravenous, Continuous  •  melatonin tab 3 mg, 3 mg, Oral, Nightly PRN  •  Albumin Human (ALBUMINAR) 5 % solution diphenhydrAMINE HCl (BENADRYL) injection 12.5 mg, 12.5 mg, Intravenous, Q4H PRN  •  Flecainide Acetate (TAMBOCOR) tab 150 mg, 150 mg, Oral, BID  No current outpatient medications on file.         PHYSICAL EXAM:     Vital Signs: BP (!) 81/64   Pulse 80   Te LYPERCENT 7.8 08/07/2020    MOPERCENT 11.5 08/07/2020    EOPERCENT 0.0 08/07/2020    BAPERCENT 0.1 08/07/2020    NE 15.42 (H) 08/07/2020    LYMABS 1.51 08/07/2020    MOABSO 2.22 (H) 08/07/2020    EOABSO 0.00 08/07/2020    BAABSO 0.02 08/07/2020     Lab Res in the care of your patient. Please do not hesitate to contact me with concerns or questions.     Mingo Mensah MD  [address] Nephrology  [facility] 93  [address] [facility], [address] [place] [address]    8/7/2020  2:23 PM

## 2020-08-07 NOTE — PROGRESS NOTES
[facility] Cardiology  Progress Note    Vidya Dry Patient Status:  Inpatient    1950 MRN [id number]   [facility] 6NE-A Attending Jamey Gan MD   Hosp Day # 2 PCP Milad Mccain     Outpatient cardiologist:  Marcos Conner sounds are normoactive  Extremities: No clubbing/cyanosis; moves all 4 extremities normally    insulin detemir (LEVEMIR) 100 UNIT/ML flextouch 15 Units, 15 Units, Subcutaneous, Q12H  oxyCODONE-acetaminophen (PERCOCET)  MG per tab 1 tablet, 1 tablet, Intravenous, Continuous PRN  Nitroprusside Sodium (NIPRIDE) 50 mg in dextrose 5 % 250 mL infusion, 0.1-4 mcg/kg/min, Intravenous, Continuous PRN  docusate sodium (COLACE) cap 100 mg, 100 mg, Oral, BID  PEG 3350 (MIRALAX) powder packet 17 g, 17 g, Oral, Katy Fees 08/07/2020       Telemetry: junctional bradycardia 55bpm.      Thank you for allowing our practice to participate in the care of your patient. Please do not hesitate to contact me if you have any questions.     Zachary Calvo MD  8/6/2020  11:29 AM

## 2020-08-08 LAB
ANION GAP SERPL CALC-SCNC: 1 MMOL/L (ref 0–18)
BLOOD TYPE BARCODE: 6200
BUN BLD-MCNC: 35 MG/DL (ref 7–18)
BUN/CREAT SERPL: 29.9 (ref 10–20)
CALCIUM BLD-MCNC: 8.3 MG/DL (ref 8.5–10.1)
CHLORIDE SERPL-SCNC: 100 MMOL/L (ref 98–112)
CO2 SERPL-SCNC: 29 MMOL/L (ref 21–32)
CREAT BLD-MCNC: 1.17 MG/DL (ref 0.7–1.3)
GLUCOSE BLD-MCNC: 204 MG/DL (ref 70–99)
GLUCOSE BLD-MCNC: 249 MG/DL (ref 70–99)
GLUCOSE BLD-MCNC: 265 MG/DL (ref 70–99)
GLUCOSE BLD-MCNC: 305 MG/DL (ref 70–99)
GLUCOSE BLD-MCNC: 351 MG/DL (ref 70–99)
OSMOLALITY SERPL CALC.SUM OF ELEC: 284 MOSM/KG (ref 275–295)
POTASSIUM SERPL-SCNC: 4.6 MMOL/L (ref 3.5–5.1)
SODIUM SERPL-SCNC: 130 MMOL/L (ref 136–145)

## 2020-08-08 PROCEDURE — 82962 GLUCOSE BLOOD TEST: CPT

## 2020-08-08 PROCEDURE — 80048 BASIC METABOLIC PNL TOTAL CA: CPT | Performed by: NURSE PRACTITIONER

## 2020-08-08 RX ORDER — AMIODARONE HYDROCHLORIDE 200 MG/1
400 TABLET ORAL 3 TIMES DAILY
Status: DISCONTINUED | OUTPATIENT
Start: 2020-08-08 | End: 2020-08-12

## 2020-08-08 NOTE — PROGRESS NOTES
[facility]  Nephrology Progress Note    Hali Dixon Attending:  Lakeisha Esqueda MD       SUBJECTIVE:     Laboratory parameters improved nicely  The patient is stable and feels well  Good intake    PHYSICAL EXAM:     Vital Signs: BP (!) 141/94   Pulse 0.0 08/07/2020    BAPERCENT 0.1 08/07/2020    NE 15.42 (H) 08/07/2020    LYMABS 1.51 08/07/2020    MOABSO 2.22 (H) 08/07/2020    EOABSO 0.00 08/07/2020    BAABSO 0.02 08/07/2020     Lab Results   Component Value Date    CREUR 159.00 08/07/2020     Lab Resu injection 50 mL, 50 mL, Intravenous, Q15 Min PRN    Or  glucose (DEX4) oral liquid 30 g, 30 g, Oral, Q15 Min PRN    Or  Glucose-Vitamin C (DEX-4) chewable tab 8 tablet, 8 tablet, Oral, Q15 Min PRN  aspirin EC EC tab 325 mg, 325 mg, Oral, Daily  atorvastati Continuous  Pantoprazole Sodium (PROTONIX) 40 mg in Sodium Chloride 0.9 % 10 mL IV push, 40 mg, Intravenous, QAM AC    Or  Pantoprazole Sodium (PROTONIX) EC tab 40 mg, 40 mg, Oral, QAM AC        ASSESSMENT/PLAN:   77 yo M with history of symptomatic aortic

## 2020-08-08 NOTE — PLAN OF CARE
Assumed care of patient at [address] [place] [address]. Patient alert and oriented x4, Denies any cp or sob. Ventricular epicardial pacer wires connected at rate 60, mA 20. Pt does have chronic headache, relieved with prn oxycodone.  Pt in afib, hr in 100-110s, per cards po metop

## 2020-08-08 NOTE — PROGRESS NOTES
[facility]   [facility] Progress Note    Juve Lima Patient Status:  Inpatient    1950 MRN [id number]   [facility] 6NE-A Attending Abdulkadir Duran MD   Hosp Day # 3 PCP ENDY SHERMAN     Subjective:  Laying in bed this am no leilani (DUONEB) nebulizer solution 3 mL, 3 mL, Nebulization, Q4H PRN  glucose (DEX4) oral liquid 15 g, 15 g, Oral, Q15 Min PRN    Or  Glucose-Vitamin C (DEX-4) chewable tab 4 tablet, 4 tablet, Oral, Q15 Min PRN    Or  dextrose 50 % injection 50 mL, 50 mL, Intrave Intravenous, PRN  Magnesium Sulfate IVPB premix SOLN 2 g, 2 g, Intravenous, PRN  mupirocin (BACTROBAN) 2% nasal ointment OINT 1 Application, 1 Application, Nasal, BID  dextrose 5 %-0.45 % NaCl infusion,  mL/hr, Intravenous, Continuous  Pantoprazole S by EP Dr. Soler Hopivonne will start amiodarone tomorrow to allow flecainide to wash out .  - Nephrology following  Creatine 1.17 lasix on hold  Output good .    - Pain management home dose percocet PRN Chronic pain headaches .   - PW in place maintain for now , back

## 2020-08-08 NOTE — PROGRESS NOTES
[facility] Hospitalist Progress Note                                                                   [address] [facility]  6/20/1950    SUBJECTIVE:  Pt seen and examined.   Feels better today, pain contr Subcutaneous Q12H   • Insulin Aspart Pen  1-30 Units Subcutaneous TID CC and HS   • Insulin Aspart Pen  1-30 Units Subcutaneous TID CC and HS   • aspirin  325 mg Oral Daily   • atorvastatin  20 mg Oral Nightly   • Fluticasone Propionate  1 spray Nasal Jessica hx  -DCCV x1 in OR  -converted back to a-fib last night, amio po started   -per cards, got flecainide 8/4, now holding, amio started per EP  -holding BB; holding ace for now     # Acute blood loss anemia due to surgery  - hgb stable at 9.6  - repeat CBC in

## 2020-08-08 NOTE — PLAN OF CARE
Assumed care of pt @ 1930. Rec'd pt in chair, resting. Pt. Back to bed with one person assist.  Mild MARQUES noted. Tele=AFIB, rate 80-90, normotensive. Pt. Asymptomatic @ this time. Surgical sites c/d/I.   Dr. Klaudia Mattson notified of HS BS, orders rec'd and alexandre

## 2020-08-08 NOTE — CONSULTS
[facility]    PATIENT'S NAME: Lincoln Dominguez   ATTENDING PHYSICIAN: Barrett Miller MD   CONSULTING PHYSICIAN: Gregg Guadarrama M.D.    PATIENT ACCOUNT#:   [de-identified]    LOCATION:  [address] [place] [address]  MEDICAL RECORD #:   [id number]       DATE OF BIRTH:  [birth year] hypertension, diabetes, hyperlipidemia, cholecystectomy, tonsillectomy. MEDICATIONS:  Insulin, inhalers, atorvastatin, and aspirin. ALLERGIES:  Penicillin and sulfa antibiotics. FAMILY HISTORY:  Negative for premature CAD.     SOCIAL HISTORY:  Pete Murders including coronary artery disease. 4.   Has epicardial wires. 5.   Would start amiodarone for rate control. He did have some junctional rhythm earlier, but now rates in the 90s. 6.   May need cardioversion in future.   7.   Reviewed amiodarone with the

## 2020-08-08 NOTE — PROGRESS NOTES
[facility] Card[facility]  Progress Note    Andre Gaytan Patient Status:  Inpatient    1950 MRN [id number]   [facility] 6NE-A Attending Dali Cavazos MD   Hosp Day # 3 PCP Floretta Sever     Outpatient cardiologist:  Susie Pinto rhythm; no murmurs/rubs/gallops are appreciated  Lungs: Clear to auscultation bilaterally; no accessory muscle use. CT serosanguinous output.   Abdomen: Soft, non-tender; bowel sounds are normoactive  Extremities: No clubbing/cyanosis; moves all 4 extremiti Intravenous, Continuous PRN  norepinephrine (LEVOPHED) 4 mg/250 ml premix infusion, 0.5-30 mcg/min, Intravenous, Continuous PRN  Nitroprusside Sodium (NIPRIDE) 50 mg in dextrose 5 % 250 mL infusion, 0.1-4 mcg/kg/min, Intravenous, Continuous PRN  docusate s hesitate to contact me if you have any questions.     José Borden MD

## 2020-08-08 NOTE — PROGRESS NOTES
[facility]  Progress Note    Chau Pod Patient Status:  Inpatient    1950 MRN [id number]   [facility] 6NE-A Attending Pari Billings MD   Hosp Day # 3 PCP ENDY SHERMAN       Assessment/Plan: 78year-old gentleman with un 13.0 - 17.5 g/dL    HCT 28.3 (L) 39.0 - 53.0 %    PLT 97.0 (L) 150.0 - 450.0 10(3)uL    MCV 94.3 80.0 - 100.0 fL    MCH 32.0 26.0 - 34.0 pg    MCHC 33.9 31.0 - 37.0 g/dL    RDW 12.3 11.0 - 15.0 %    RDW-SD 42.5 35.1 - 46.3 fL    Neutrophil Absolute Prelim mmol/L   UREA NITROGEN, U    Collection Time: 08/07/20 10:49 AM   Result Value Ref Range    Urea Ur Random 424.0 mg/dL   CREATININE, URINE, RANDOM    Collection Time: 08/07/20 10:49 AM   Result Value Ref Range    Creatinine Ur Random 159.00 mg/dL   OSMOLAL Lab Results   Component Value Date    PGLU 414 08/07/2020     Recent Labs   Lab 08/07/20  0745 08/07/20  1206 08/07/20  1558 08/07/20  1903 08/07/20  2111   PGLU [id number]

## 2020-08-09 LAB
ANION GAP SERPL CALC-SCNC: 3 MMOL/L (ref 0–18)
BASOPHILS # BLD AUTO: 0 X10(3) UL (ref 0–0.2)
BASOPHILS NFR BLD AUTO: 0 %
BUN BLD-MCNC: 34 MG/DL (ref 7–18)
BUN/CREAT SERPL: 36.2 (ref 10–20)
CALCIUM BLD-MCNC: 8.3 MG/DL (ref 8.5–10.1)
CHLORIDE SERPL-SCNC: 103 MMOL/L (ref 98–112)
CO2 SERPL-SCNC: 30 MMOL/L (ref 21–32)
CREAT BLD-MCNC: 0.94 MG/DL (ref 0.7–1.3)
DEPRECATED RDW RBC AUTO: 41.6 FL (ref 35.1–46.3)
EOSINOPHIL # BLD AUTO: 0 X10(3) UL (ref 0–0.7)
EOSINOPHIL NFR BLD AUTO: 0 %
ERYTHROCYTE [DISTWIDTH] IN BLOOD BY AUTOMATED COUNT: 12.4 % (ref 11–15)
GLUCOSE BLD-MCNC: 126 MG/DL (ref 70–99)
GLUCOSE BLD-MCNC: 140 MG/DL (ref 70–99)
GLUCOSE BLD-MCNC: 171 MG/DL (ref 70–99)
GLUCOSE BLD-MCNC: 249 MG/DL (ref 70–99)
GLUCOSE BLD-MCNC: 322 MG/DL (ref 70–99)
GLUCOSE BLD-MCNC: 94 MG/DL (ref 70–99)
HCT VFR BLD AUTO: 25.4 % (ref 39–53)
HGB BLD-MCNC: 8.7 G/DL (ref 13–17.5)
IMM GRANULOCYTES # BLD AUTO: 0.05 X10(3) UL (ref 0–1)
IMM GRANULOCYTES NFR BLD: 0.5 %
INR BLD: 1.31 (ref 0.89–1.11)
LYMPHOCYTES # BLD AUTO: 0.81 X10(3) UL (ref 1–4)
LYMPHOCYTES NFR BLD AUTO: 7.9 %
MCH RBC QN AUTO: 31.6 PG (ref 26–34)
MCHC RBC AUTO-ENTMCNC: 34.3 G/DL (ref 31–37)
MCV RBC AUTO: 92.4 FL (ref 80–100)
MONOCYTES # BLD AUTO: 0.9 X10(3) UL (ref 0.1–1)
MONOCYTES NFR BLD AUTO: 8.7 %
NEUTROPHILS # BLD AUTO: 8.54 X10 (3) UL (ref 1.5–7.7)
NEUTROPHILS # BLD AUTO: 8.54 X10(3) UL (ref 1.5–7.7)
NEUTROPHILS NFR BLD AUTO: 82.9 %
OSMOLALITY SERPL CALC.SUM OF ELEC: 289 MOSM/KG (ref 275–295)
PLATELET # BLD AUTO: 107 10(3)UL (ref 150–450)
POTASSIUM SERPL-SCNC: 4.3 MMOL/L (ref 3.5–5.1)
PSA SERPL DL<=0.01 NG/ML-MCNC: 16.7 SECONDS (ref 12.4–14.6)
RBC # BLD AUTO: 2.75 X10(6)UL (ref 3.8–5.8)
SODIUM SERPL-SCNC: 136 MMOL/L (ref 136–145)
WBC # BLD AUTO: 10.3 X10(3) UL (ref 4–11)

## 2020-08-09 PROCEDURE — 85610 PROTHROMBIN TIME: CPT | Performed by: THORACIC SURGERY (CARDIOTHORACIC VASCULAR SURGERY)

## 2020-08-09 PROCEDURE — 85025 COMPLETE CBC W/AUTO DIFF WBC: CPT | Performed by: INTERNAL MEDICINE

## 2020-08-09 PROCEDURE — 80048 BASIC METABOLIC PNL TOTAL CA: CPT | Performed by: INTERNAL MEDICINE

## 2020-08-09 PROCEDURE — 82962 GLUCOSE BLOOD TEST: CPT

## 2020-08-09 RX ORDER — WARFARIN SODIUM 5 MG/1
5 TABLET ORAL NIGHTLY
Status: DISCONTINUED | OUTPATIENT
Start: 2020-08-09 | End: 2020-08-11

## 2020-08-09 RX ORDER — WARFARIN SODIUM 2 MG/1
4 TABLET ORAL NIGHTLY
Status: DISCONTINUED | OUTPATIENT
Start: 2020-08-09 | End: 2020-08-09

## 2020-08-09 NOTE — PROGRESS NOTES
[facility] Cardiology  Progress Note    Silvano Reddy Patient Status:  Inpatient    1950 MRN [id number]   [facility] 6NE-A Attending Malachi Long MD   Hosp Day # 4 [facility]     Outpatient cardiologist:  Aggie Cabrera normally    insulin detemir (LEVEMIR) 100 UNIT/ML flextouch 22 Units, 22 Units, Subcutaneous, Q12H  metoprolol tartrate (LOPRESSOR) partial tablet 12.5 mg, 12.5 mg, Oral, 2x Daily(Beta Blocker)  amiodarone HCl (PACERONE) tab 400 mg, 400 mg, Oral, TID  Insu Continuous PRN  Nitroprusside Sodium (NIPRIDE) 50 mg in dextrose 5 % 250 mL infusion, 0.1-4 mcg/kg/min, Intravenous, Continuous PRN  docusate sodium (COLACE) cap 100 mg, 100 mg, Oral, BID  PEG 3350 (MIRALAX) powder packet 17 g, 17 g, Oral, Daily PRN  magne

## 2020-08-09 NOTE — PROGRESS NOTES
[address]  Nephrology Progress Note    Ubaldo Worthington Attending:  Jerod Lizarraga MD       SUBJECTIVE:     Laboratory parameters improved nicely  The patient is stable and feels well  Good intake    PHYSICAL EXAM:     Vital Signs: /81 (BP Location EOPERCENT 0.0 08/09/2020    BAPERCENT 0.0 08/09/2020    NE 8.54 (H) 08/09/2020    LYMABS 0.81 (L) 08/09/2020    MOABSO 0.90 08/09/2020    EOABSO 0.00 08/09/2020    BAABSO 0.00 08/09/2020     Lab Results   Component Value Date    CREUR 159.00 08/07/2020 tab 4 tablet, 4 tablet, Oral, Q15 Min PRN    Or  dextrose 50 % injection 50 mL, 50 mL, Intravenous, Q15 Min PRN    Or  glucose (DEX4) oral liquid 30 g, 30 g, Oral, Q15 Min PRN    Or  Glucose-Vitamin C (DEX-4) chewable tab 8 tablet, 8 tablet, Oral, Q15 Min BID  dextrose 5 %-0.45 % NaCl infusion,  mL/hr, Intravenous, Continuous  Pantoprazole Sodium (PROTONIX) 40 mg in Sodium Chloride 0.9 % 10 mL IV push, 40 mg, Intravenous, QAM AC    Or  Pantoprazole Sodium (PROTONIX) EC tab 40 mg, 40 mg, Oral, QAM AC

## 2020-08-09 NOTE — PLAN OF CARE
Assumed care of pt  @4774. Rec'd pt in bed, resting. Pt. Reports headache, percocet given per MAR.  VSS, afebrile. Tele=AFIB. Pt. Asymptomatic @ this time.

## 2020-08-09 NOTE — PLAN OF CARE
Assumed care of patient at 730. Pt up to chair for all meals and ambulating in halls. Per cv surgery ok to start anticoagulation and pacing wires to stay in until tomorrow. In Afib rate controlled 70 to 90s. Asymptomatic.  Pt states feeling slightly dizzy w

## 2020-08-09 NOTE — PROGRESS NOTES
[facility]   [facility] Progress Note    Tayler Velasquez Patient Status:  Inpatient    1950 MRN [id number]   [facility] 6NE-A Attending Afshin Roth MD   Hosp Day # 4 PCP ENDY SHERMAN     Subjective:    Miryam Clancy had some sob and 1-30 Units, 1-30 Units, Subcutaneous, TID CC and HS  Metoclopramide HCl (REGLAN) injection 5 mg, 5 mg, Intravenous, Q6H PRN  ipratropium-albuterol (DUONEB) nebulizer solution 3 mL, 3 mL, Nebulization, Q4H PRN  glucose (DEX4) oral liquid 15 g, 15 g, Oral, Q Intravenous, PRN  calcium gluconate 3 g in sodium chloride 0.9% 100 mL IVPB, 3 g, Intravenous, PRN  Magnesium Sulfate in D5W IVPB premix 1 g, 1 g, Intravenous, PRN  Magnesium Sulfate IVPB premix SOLN 2 g, 2 g, Intravenous, PRN  mupirocin (BACTROBAN) 2% monster

## 2020-08-09 NOTE — PROGRESS NOTES
[facility]  Progress Note    Bess Mcelroy Patient Status:  Inpatient    1950 MRN [id number]   [facility] 6NE-A Attending Hossein Caballero MD   Hosp Day # 4 PCP ENDY SHERMAN       Assessment/Plan: 78year-old gentleman with un 305 (H) 70 - 99 mg/dL   PREPARE RBC    Collection Time: 08/08/20  6:08 PM   Result Value Ref Range    Blood Product [id number]     Unit Number [id number]     UNIT ABO A     UNIT RH POS     Product Status Released from Counts include 234 beds at the [facility] Date 2020 08/07/20  2111 08/08/20  0848 08/08/20  1209 08/08/20  1758 08/08/20  2038   PGLU 319 [facility]

## 2020-08-09 NOTE — PROGRESS NOTES
[facility] Hospitalist Progress Note                                                                   [address] [facility]  6/20/1950    SUBJECTIVE:  Pt seen and examined.   Feels better today, pain contr • metoprolol Tartrate  12.5 mg Oral 2x Daily(Beta Blocker)   • amiodarone HCl  400 mg Oral TID   • Insulin Aspart Pen  1-30 Units Subcutaneous TID CC and HS   • Tiotropium Bromide-Olodaterol  2 puff Inhalation Daily   • Insulin Aspart Pen  1-30 Units Sub Hyponatremia   - volume depletion and pseudohyponatremia   - renal c/s  - urine studies noted  - monitor BMP per renal, improving     # pAF, HTN hx  -DCCV x1 in OR  -converted back to a-fib last night, amio po started   -per cards, got flecainide 8/4, now

## 2020-08-10 LAB
ANION GAP SERPL CALC-SCNC: 3 MMOL/L (ref 0–18)
BASOPHILS # BLD AUTO: 0.02 X10(3) UL (ref 0–0.2)
BASOPHILS NFR BLD AUTO: 0.3 %
BUN BLD-MCNC: 27 MG/DL (ref 7–18)
BUN/CREAT SERPL: 32.5 (ref 10–20)
CALCIUM BLD-MCNC: 7.9 MG/DL (ref 8.5–10.1)
CHLORIDE SERPL-SCNC: 106 MMOL/L (ref 98–112)
CO2 SERPL-SCNC: 30 MMOL/L (ref 21–32)
CREAT BLD-MCNC: 0.83 MG/DL (ref 0.7–1.3)
DEPRECATED RDW RBC AUTO: 41.1 FL (ref 35.1–46.3)
EOSINOPHIL # BLD AUTO: 0.08 X10(3) UL (ref 0–0.7)
EOSINOPHIL NFR BLD AUTO: 1.1 %
ERYTHROCYTE [DISTWIDTH] IN BLOOD BY AUTOMATED COUNT: 12.5 % (ref 11–15)
GLUCOSE BLD-MCNC: 110 MG/DL (ref 70–99)
GLUCOSE BLD-MCNC: 209 MG/DL (ref 70–99)
GLUCOSE BLD-MCNC: 217 MG/DL (ref 70–99)
GLUCOSE BLD-MCNC: 83 MG/DL (ref 70–99)
GLUCOSE BLD-MCNC: 98 MG/DL (ref 70–99)
HCT VFR BLD AUTO: 26.1 % (ref 39–53)
HGB BLD-MCNC: 8.9 G/DL (ref 13–17.5)
IMM GRANULOCYTES # BLD AUTO: 0.06 X10(3) UL (ref 0–1)
IMM GRANULOCYTES NFR BLD: 0.8 %
INR BLD: 1.21 (ref 0.89–1.11)
LYMPHOCYTES # BLD AUTO: 1.5 X10(3) UL (ref 1–4)
LYMPHOCYTES NFR BLD AUTO: 19.8 %
MCH RBC QN AUTO: 31.2 PG (ref 26–34)
MCHC RBC AUTO-ENTMCNC: 34.1 G/DL (ref 31–37)
MCV RBC AUTO: 91.6 FL (ref 80–100)
MONOCYTES # BLD AUTO: 0.89 X10(3) UL (ref 0.1–1)
MONOCYTES NFR BLD AUTO: 11.7 %
NEUTROPHILS # BLD AUTO: 5.03 X10 (3) UL (ref 1.5–7.7)
NEUTROPHILS # BLD AUTO: 5.03 X10(3) UL (ref 1.5–7.7)
NEUTROPHILS NFR BLD AUTO: 66.3 %
OSMOLALITY SERPL CALC.SUM OF ELEC: 292 MOSM/KG (ref 275–295)
PLATELET # BLD AUTO: 126 10(3)UL (ref 150–450)
POTASSIUM SERPL-SCNC: 4 MMOL/L (ref 3.5–5.1)
PSA SERPL DL<=0.01 NG/ML-MCNC: 15.7 SECONDS (ref 12.4–14.6)
RBC # BLD AUTO: 2.85 X10(6)UL (ref 3.8–5.8)
SODIUM SERPL-SCNC: 139 MMOL/L (ref 136–145)
WBC # BLD AUTO: 7.6 X10(3) UL (ref 4–11)

## 2020-08-10 PROCEDURE — 82962 GLUCOSE BLOOD TEST: CPT

## 2020-08-10 PROCEDURE — 97116 GAIT TRAINING THERAPY: CPT

## 2020-08-10 PROCEDURE — 80048 BASIC METABOLIC PNL TOTAL CA: CPT | Performed by: INTERNAL MEDICINE

## 2020-08-10 PROCEDURE — 85610 PROTHROMBIN TIME: CPT | Performed by: THORACIC SURGERY (CARDIOTHORACIC VASCULAR SURGERY)

## 2020-08-10 PROCEDURE — 97110 THERAPEUTIC EXERCISES: CPT

## 2020-08-10 PROCEDURE — 97530 THERAPEUTIC ACTIVITIES: CPT

## 2020-08-10 PROCEDURE — 85025 COMPLETE CBC W/AUTO DIFF WBC: CPT | Performed by: INTERNAL MEDICINE

## 2020-08-10 PROCEDURE — C9113 INJ PANTOPRAZOLE SODIUM, VIA: HCPCS | Performed by: THORACIC SURGERY (CARDIOTHORACIC VASCULAR SURGERY)

## 2020-08-10 RX ORDER — SIMETHICONE 80 MG
80 TABLET,CHEWABLE ORAL 4 TIMES DAILY PRN
Status: DISCONTINUED | OUTPATIENT
Start: 2020-08-10 | End: 2020-08-12

## 2020-08-10 NOTE — PLAN OF CARE
Assumed care of pt 1930. A+O x4, neuro intact. Baseline headache managed with percocet given per [facility]. Controlled afib. HR 70s. VSS on RA. Sternal incision C/D/I, approximated. Warfarin started, handout given. Transfer orders in, awaiting bed.  Pt resting in

## 2020-08-10 NOTE — PROGRESS NOTES
[facility] Hospitalist Progress Note                                                                   [address] [facility]  6/20/1950    SUBJECTIVE:  Pt seen and examined.   Feels much better today, pain 08/09/20  2009 08/10/20  0722 08/10/20  1133   PGLU 249* 126* 171* 98 209*       Meds:   Scheduled:   • insulin detemir  22 Units Subcutaneous Q12H   • Warfarin Sodium  5 mg Oral Nightly   • metoprolol Tartrate  12.5 mg Oral 2x Daily(Beta Blocker)   • amio # pAF, HTN hx  -DCCV x1 in OR  -converted back to a-fib last night, amio po started   -per cards, got flecainide 8/4, now holding, amio started per EP  -holding BB; holding ace for now  -okay to start warfarin per CV surgery     # Acute blood loss anem

## 2020-08-10 NOTE — CARDIAC REHAB
Cardiac rehab education initiated with patient and wife.   Patient lives closer to [facility]. Phone number placed in AVS.

## 2020-08-10 NOTE — PLAN OF CARE
Assumed care of the pt at 0730, sitting up in chair. VSS, afebrile, remains in controlled afib, no change, MD aware. C/O chronic headaches that he takes percocet for. Ordered PRN but pt asks for them every 5 hours.   States that is how he has been taking th baseline  Description  INTERVENTIONS:  - Continuous cardiac monitoring, monitor vital signs, obtain 12 lead EKG if indicated  - Evaluate effectiveness of antiarrhythmic and heart rate control medications as ordered  - Initiate emergency measures for life t in ADLs to maximize function  - Promote sitting position while performing ADLs such as feeding, grooming, and bathing  - Educate and encourage patient/family in tolerated functional activity level and precautions during self-care     Outcome: Progressing

## 2020-08-10 NOTE — PHYSICAL THERAPY NOTE
PHYSICAL THERAPY TREATMENT NOTE - INPATIENT    Room Number: 3145/7221-L     Session: 2  Number of Visits to Meet Established Goals: 3    Presenting Problem: S/p AVR on 8/5/20     History related to current admission: Pt is s/p AVR on 8/5.     PMH: CURT guaman, Good  Dynamic Sitting: Good           Static Standing: Fair -  Dynamic Standing: Fair -    ACTIVITY TOLERANCE  Pulse: 102  Heart Rate Source: Monitor                   O2 WALK                    AM-PAC '6-Clicks' INPATIENT SHORT FORM - BASIC MOBILITY  How 200 ft with r/w and supervision/cga, trail sans AD, pt with increasing crossover step and mildly impulsive. Recommend r/w at this time. Pt continues gait with r/w and supervision.  Pt trained on stairs with step to pattern and cueing for sequencing and to g Discharge Recommendations: Home with home health PT     PLAN  PT Treatment Plan: Bed mobility; Body mechanics; Patient education; Family education;Gait training;Strengthening;Transfer training;Balance training  Rehab Potential : Good  Frequency (Obs): 3-5x/we

## 2020-08-10 NOTE — PROGRESS NOTES
[facility]  Nephrology Progress Note    Melo Escobar Attending:  Aida Reyes MD       SUBJECTIVE:     Laboratory parameters improved nicely  The patient is stable and feels well  Good intake  Walked up stairs with physical therapy     PHYSICAL EXAM 08/10/2020    MOPERCENT 11.7 08/10/2020    EOPERCENT 1.1 08/10/2020    BAPERCENT 0.3 08/10/2020    NE 5.03 08/10/2020    LYMABS 1.50 08/10/2020    MOABSO 0.89 08/10/2020    EOABSO 0.08 08/10/2020    BAABSO 0.02 08/10/2020     Lab Results   Component Value Or  dextrose 50 % injection 50 mL, 50 mL, Intravenous, Q15 Min PRN    Or  glucose (DEX4) oral liquid 30 g, 30 g, Oral, Q15 Min PRN    Or  Glucose-Vitamin C (DEX-4) chewable tab 8 tablet, 8 tablet, Oral, Q15 Min PRN  aspirin EC EC tab 325 mg, 325 mg, Oral, nicely with albumin and saline  --Continue oral intake  Liberalization of fluid restriction      Hyponatremia:  --Due to mild volume depletion.   Improved.     Hyperkalemia: in the setting of hyperglycemia, low urine flow rates  --Improved with hydration an

## 2020-08-10 NOTE — PROGRESS NOTES
[facility]  Progress Note    Bella Capone Patient Status:  Inpatient    1950 MRN [id number]   [facility] 6NE-A Attending Carlos Uribe MD   Hosp Day # 5 PCP ENDY SHERMAN       Assessment/Plan: 78year-old gentleman with un Result Value Ref Range    PT 16.7 (H) 12.4 - 14.6 seconds    INR 1.31 (H) 0.89 - 1.11   POCT GLUCOSE    Collection Time: 08/09/20  1:37 PM   Result Value Ref Range    POC Glucose 249 (H) 70 - 99 mg/dL   POCT GLUCOSE    Collection Time: 08/09/20  4:32 PM Eosinophil % 1.1 %    Basophil % 0.3 %    Immature Granulocyte % 0.8 %     Lab Results   Component Value Date    PGLU 171 08/09/2020     Recent Labs   Lab 08/09/20  0717 08/09/20  1121 08/09/20  1337 08/09/20  1632 08/09/20 2009   PGLU 140* 322* 249* 1 major surgery lasting over 3 hrs

## 2020-08-10 NOTE — PROGRESS NOTES
[facility]  CV Surgery Progress Note    Georgette Waldron Patient Status:  Inpatient    1950 MRN [id number]   [facility] 6NE-A Attending Aamir Guzman MD   Hosp Day # 5 PCP ENDY SHERMAN     Subjective:  Patient seen and examine No sign of acute CHF. No new or worsening process.       Assessment/Plan:   # POD 5 s/p AVR, ANDREW ligation, PVI   -HD stable  -leukocytosis - resolved    -mild post op anemia - monitor   -thrombocytopenia, improving - HIPA negative, monitor   -Vol status -

## 2020-08-11 LAB
BASOPHILS # BLD AUTO: 0 X10(3) UL (ref 0–0.2)
BASOPHILS NFR BLD AUTO: 0 %
DEPRECATED HBV CORE AB SER IA-ACNC: 798.1 NG/ML (ref 30–530)
DEPRECATED RDW RBC AUTO: 41.4 FL (ref 35.1–46.3)
EOSINOPHIL # BLD AUTO: 0.28 X10(3) UL (ref 0–0.7)
EOSINOPHIL NFR BLD AUTO: 3.6 %
ERYTHROCYTE [DISTWIDTH] IN BLOOD BY AUTOMATED COUNT: 12.6 % (ref 11–15)
GLUCOSE BLD-MCNC: 107 MG/DL (ref 70–99)
GLUCOSE BLD-MCNC: 203 MG/DL (ref 70–99)
GLUCOSE BLD-MCNC: 209 MG/DL (ref 70–99)
GLUCOSE BLD-MCNC: 264 MG/DL (ref 70–99)
GLUCOSE BLD-MCNC: 56 MG/DL (ref 70–99)
GLUCOSE BLD-MCNC: 87 MG/DL (ref 70–99)
HAV IGM SER QL: 2.2 MG/DL (ref 1.6–2.6)
HCT VFR BLD AUTO: 27.7 % (ref 39–53)
HGB BLD-MCNC: 9.6 G/DL (ref 13–17.5)
IMM GRANULOCYTES # BLD AUTO: 0.13 X10(3) UL (ref 0–1)
IMM GRANULOCYTES NFR BLD: 1.7 %
INR BLD: 1.15 (ref 0.89–1.11)
IRON SATURATION: 11 % (ref 20–50)
IRON SERPL-MCNC: 31 UG/DL (ref 65–175)
LYMPHOCYTES # BLD AUTO: 1.35 X10(3) UL (ref 1–4)
LYMPHOCYTES NFR BLD AUTO: 17.5 %
MCH RBC QN AUTO: 32.1 PG (ref 26–34)
MCHC RBC AUTO-ENTMCNC: 34.7 G/DL (ref 31–37)
MCV RBC AUTO: 92.6 FL (ref 80–100)
MONOCYTES # BLD AUTO: 0.77 X10(3) UL (ref 0.1–1)
MONOCYTES NFR BLD AUTO: 10 %
NEUTROPHILS # BLD AUTO: 5.17 X10 (3) UL (ref 1.5–7.7)
NEUTROPHILS # BLD AUTO: 5.17 X10(3) UL (ref 1.5–7.7)
NEUTROPHILS NFR BLD AUTO: 67.2 %
PLATELET # BLD AUTO: 146 10(3)UL (ref 150–450)
POTASSIUM SERPL-SCNC: 3.8 MMOL/L (ref 3.5–5.1)
PSA SERPL DL<=0.01 NG/ML-MCNC: 15.1 SECONDS (ref 12.4–14.6)
RBC # BLD AUTO: 2.99 X10(6)UL (ref 3.8–5.8)
TOTAL IRON BINDING CAPACITY: 289 UG/DL (ref 240–450)
TRANSFERRIN SERPL-MCNC: 194 MG/DL (ref 200–360)
WBC # BLD AUTO: 7.7 X10(3) UL (ref 4–11)

## 2020-08-11 PROCEDURE — 93005 ELECTROCARDIOGRAM TRACING: CPT

## 2020-08-11 PROCEDURE — 85025 COMPLETE CBC W/AUTO DIFF WBC: CPT | Performed by: NURSE PRACTITIONER

## 2020-08-11 PROCEDURE — 83540 ASSAY OF IRON: CPT | Performed by: INTERNAL MEDICINE

## 2020-08-11 PROCEDURE — 93010 ELECTROCARDIOGRAM REPORT: CPT | Performed by: INTERNAL MEDICINE

## 2020-08-11 PROCEDURE — 82728 ASSAY OF FERRITIN: CPT | Performed by: INTERNAL MEDICINE

## 2020-08-11 PROCEDURE — 85610 PROTHROMBIN TIME: CPT | Performed by: THORACIC SURGERY (CARDIOTHORACIC VASCULAR SURGERY)

## 2020-08-11 PROCEDURE — 83735 ASSAY OF MAGNESIUM: CPT | Performed by: INTERNAL MEDICINE

## 2020-08-11 PROCEDURE — 83550 IRON BINDING TEST: CPT | Performed by: INTERNAL MEDICINE

## 2020-08-11 PROCEDURE — 84132 ASSAY OF SERUM POTASSIUM: CPT | Performed by: INTERNAL MEDICINE

## 2020-08-11 PROCEDURE — 82962 GLUCOSE BLOOD TEST: CPT

## 2020-08-11 RX ORDER — WARFARIN SODIUM 7.5 MG/1
7.5 TABLET ORAL NIGHTLY
Status: DISCONTINUED | OUTPATIENT
Start: 2020-08-11 | End: 2020-08-12

## 2020-08-11 RX ORDER — POTASSIUM CHLORIDE 20 MEQ/1
40 TABLET, EXTENDED RELEASE ORAL ONCE
Status: COMPLETED | OUTPATIENT
Start: 2020-08-11 | End: 2020-08-11

## 2020-08-11 NOTE — PROGRESS NOTES
[facility] Cardiology  Progress Note    Andre Gaytan Patient Status:  Inpatient    1950 MRN [id number]   [facility] 6NE-A Attending Dali Cavazos MD   Hosp Day # 5 [facility]     Outpatient cardiologist:  Susie Pinto sounds are normoactive  Extremities: No clubbing/cyanosis; moves all 4 extremities normally    simethicone (MYLICON) chewable tab 80 mg, 80 mg, Oral, QID PRN  insulin detemir (LEVEMIR) 100 UNIT/ML flextouch 22 Units, 22 Units, Subcutaneous, Q12H  Warfarin Sodium (NIPRIDE) 50 mg in dextrose 5 % 250 mL infusion, 0.1-4 mcg/kg/min, Intravenous, Continuous PRN  docusate sodium (COLACE) cap 100 mg, 100 mg, Oral, BID  PEG 3350 (MIRALAX) powder packet 17 g, 17 g, Oral, Daily PRN  bisacodyl (DULCOLAX) rectal supposi

## 2020-08-11 NOTE — PROGRESS NOTES
[address]  Nephrology Progress Note    Aurther Gear Attending:  Karoline Gil MD       SUBJECTIVE:     Laboratory parameters improved nicely  The patient is stable and feels well  Good intake  Likely cardioversion tomorrow    PHYSICAL EXAM:     Letty MOPERCENT 10.0 08/11/2020    EOPERCENT 3.6 08/11/2020    BAPERCENT 0.0 08/11/2020    NE 5.17 08/11/2020    LYMABS 1.35 08/11/2020    MOABSO 0.77 08/11/2020    EOABSO 0.28 08/11/2020    BAABSO 0.00 08/11/2020     Lab Results   Component Value Date    CREUR tablet, 4 tablet, Oral, Q15 Min PRN    Or  dextrose 50 % injection 50 mL, 50 mL, Intravenous, Q15 Min PRN    Or  glucose (DEX4) oral liquid 30 g, 30 g, Oral, Q15 Min PRN    Or  Glucose-Vitamin C (DEX-4) chewable tab 8 tablet, 8 tablet, Oral, Q15 Min PRN  a

## 2020-08-11 NOTE — PLAN OF CARE
Dr. Grayson Cardenas updated, pt to receive 5 mg warfarin tonight.      0719: Pt transferred to [address] [place] [address]

## 2020-08-11 NOTE — PLAN OF CARE
Assumed care of pt 1930. A+Ox4, neuro intact. Afib with occasional PVCs. VSS on RA. Sternum incision C/D/I, approximated. Pressure dressing on pacer wire removal site C/D/I, no drainage. PRN percocet for chronic headaches, see MAR.  Tele orders, awaiting be

## 2020-08-11 NOTE — PROGRESS NOTES
[facility]  CV Surgery Progress Note    Floreshemantedward Mcelroy Patient Status:  Inpatient    1950 MRN [id number]   [facility] 6NE-A Attending Hossein Caballero MD   Hosp Day # 6 PCP ENDY SHERMAN     Subjective:  Patient seen and examine ligation, PVI   -HD stable  -mild post op anemia and TCP improving - HIPA negative, monitor   -Vol status - liberalized fluid intake per nephrology, monitor volume status   -Pain management - home percocet prn  -PPS - s/p IV solumedrol 8/7    -CT removed a

## 2020-08-11 NOTE — PROGRESS NOTES
[facility]  Progress Note    Hali Dixon Patient Status:  Inpatient    1950 MRN [id number]   [facility] 6NE-A Attending [place], MD   Hosp Day # 6 PCP ENDY SHERMAN       Assessment/Plan: 78year-old gentleman with un Glucose 110 (H) 70 - 99 mg/dL   POCT GLUCOSE    Collection Time: 08/10/20  8:38 PM   Result Value Ref Range    POC Glucose 217 (H) 70 - 99 mg/dL   PROTHROMBIN TIME (PT)    Collection Time: 08/11/20  4:55 AM   Result Value Ref Range    PT 15.1 (H) 12.4 - 14

## 2020-08-11 NOTE — DIETARY NOTE
[address]     Admitting diagnosis:  aortic insufficiency  Aortic insufficiency    Ht: 194.3 cm (6' 4.5\")  Wt: 118.7 kg (261 lb 11 oz). This is 124 % of IBW  Body mass index is 31.44 kg/m².   IBW: 93.2kg    Wt Daniela Sorto

## 2020-08-11 NOTE — PLAN OF CARE
Pt in chair, alarm on. HR low 100s with exertion, 80s-90s with rest. Report called to CTU 8. Will prepare pt to transfer at 0700. Pt and wife updated, will continue to monitor closely.

## 2020-08-11 NOTE — PROGRESS NOTES
[facility]  Progress Note    Severo Spry Patient Status:  Inpatient    1950 MRN [id number]   [facility] 6NE-A Attending Gwenette Curling, MD   Hosp Day # 6 [facility]     Outpatient cardiologist:  Kelle Virgen appreciated  Lungs: Clear to auscultation bilaterally; no accessory muscle use. CT serosanguinous output.   Abdomen: Soft, non-tender; bowel sounds are normoactive  Extremities: No clubbing/cyanosis; moves all 4 extremities normally    Potassium Chloride ER Once PRN  nitroGLYCERIN infusion 50mg in D5W 250ml, 5-300 mcg/min, Intravenous, Continuous PRN  norepinephrine (LEVOPHED) 4 mg/250 ml premix infusion, 0.5-30 mcg/min, Intravenous, Continuous PRN  Nitroprusside Sodium (NIPRIDE) 50 mg in dextrose 5 % 250 mL

## 2020-08-11 NOTE — PROGRESS NOTES
[facility] Hospitalist Progress Note                                                                   [address] [facility]  6/20/1950    SUBJECTIVE:  Pt seen and examined.   Feels much better today, pain hours.    Invalid input(s): ALPHOS, TBIL, DBIL, TPROT    Recent Labs   Lab 08/10/20  0722 08/10/20  1133 08/10/20  1653 08/10/20  2038 08/11/20  0750   PGLU 98 209* 110* 217* 107*       Meds:   Scheduled:   • metoprolol Tartrate  25 mg Oral 2x Daily(Beta B remains in a-fib     # Acute blood loss anemia due to surgery  - hgb stable at 9.6  - repeat CBC in AM    # CAD  -asa, statin     # DM2 with hyperglycemia   -endo for post-op hyperglycemia management  -insulin per them      # Asthma with routine monitoring

## 2020-08-12 ENCOUNTER — APPOINTMENT (OUTPATIENT)
Dept: INTERVENTIONAL RADIOLOGY/VASCULAR | Facility: HOSPITAL | Age: 70
DRG: 220 | End: 2020-08-12
Attending: INTERNAL MEDICINE
Payer: MEDICARE

## 2020-08-12 ENCOUNTER — APPOINTMENT (OUTPATIENT)
Dept: CV DIAGNOSTICS | Facility: HOSPITAL | Age: 70
DRG: 220 | End: 2020-08-12
Attending: INTERNAL MEDICINE
Payer: MEDICARE

## 2020-08-12 VITALS
HEART RATE: 90 BPM | HEIGHT: 76.5 IN | BODY MASS INDEX: 31.62 KG/M2 | OXYGEN SATURATION: 99 % | WEIGHT: 262.38 LBS | DIASTOLIC BLOOD PRESSURE: 99 MMHG | TEMPERATURE: 99 F | SYSTOLIC BLOOD PRESSURE: 142 MMHG | RESPIRATION RATE: 16 BRPM

## 2020-08-12 LAB
ANION GAP SERPL CALC-SCNC: 4 MMOL/L (ref 0–18)
ATRIAL RATE: 96 BPM
BUN BLD-MCNC: 17 MG/DL (ref 7–18)
BUN/CREAT SERPL: 18.5 (ref 10–20)
CALCIUM BLD-MCNC: 8.3 MG/DL (ref 8.5–10.1)
CHLORIDE SERPL-SCNC: 106 MMOL/L (ref 98–112)
CO2 SERPL-SCNC: 28 MMOL/L (ref 21–32)
CREAT BLD-MCNC: 0.92 MG/DL (ref 0.7–1.3)
DEPRECATED RDW RBC AUTO: 43 FL (ref 35.1–46.3)
ERYTHROCYTE [DISTWIDTH] IN BLOOD BY AUTOMATED COUNT: 13.2 % (ref 11–15)
GLUCOSE BLD-MCNC: 119 MG/DL (ref 70–99)
GLUCOSE BLD-MCNC: 124 MG/DL (ref 70–99)
GLUCOSE BLD-MCNC: 134 MG/DL (ref 70–99)
HCT VFR BLD AUTO: 28.3 % (ref 39–53)
HGB BLD-MCNC: 9.5 G/DL (ref 13–17.5)
INR BLD: 1.27 (ref 0.89–1.11)
MCH RBC QN AUTO: 31.5 PG (ref 26–34)
MCHC RBC AUTO-ENTMCNC: 33.6 G/DL (ref 31–37)
MCV RBC AUTO: 93.7 FL (ref 80–100)
OSMOLALITY SERPL CALC.SUM OF ELEC: 289 MOSM/KG (ref 275–295)
PLATELET # BLD AUTO: 174 10(3)UL (ref 150–450)
POTASSIUM SERPL-SCNC: 4 MMOL/L (ref 3.5–5.1)
PSA SERPL DL<=0.01 NG/ML-MCNC: 16.3 SECONDS (ref 12.4–14.6)
Q-T INTERVAL: 380 MS
QRS DURATION: 96 MS
QTC CALCULATION (BEZET): 490 MS
R AXIS: -6 DEGREES
RBC # BLD AUTO: 3.02 X10(6)UL (ref 3.8–5.8)
SODIUM SERPL-SCNC: 138 MMOL/L (ref 136–145)
T AXIS: 152 DEGREES
VENTRICULAR RATE: 100 BPM
WBC # BLD AUTO: 6.1 X10(3) UL (ref 4–11)

## 2020-08-12 PROCEDURE — 99152 MOD SED SAME PHYS/QHP 5/>YRS: CPT

## 2020-08-12 PROCEDURE — 99153 MOD SED SAME PHYS/QHP EA: CPT

## 2020-08-12 PROCEDURE — B24BZZ4 ULTRASONOGRAPHY OF HEART WITH AORTA, TRANSESOPHAGEAL: ICD-10-PCS | Performed by: INTERNAL MEDICINE

## 2020-08-12 PROCEDURE — 82962 GLUCOSE BLOOD TEST: CPT

## 2020-08-12 PROCEDURE — 97116 GAIT TRAINING THERAPY: CPT

## 2020-08-12 PROCEDURE — 93320 DOPPLER ECHO COMPLETE: CPT | Performed by: INTERNAL MEDICINE

## 2020-08-12 PROCEDURE — 93312 ECHO TRANSESOPHAGEAL: CPT

## 2020-08-12 PROCEDURE — 93325 DOPPLER ECHO COLOR FLOW MAPG: CPT | Performed by: INTERNAL MEDICINE

## 2020-08-12 PROCEDURE — 85027 COMPLETE CBC AUTOMATED: CPT | Performed by: NURSE PRACTITIONER

## 2020-08-12 PROCEDURE — 85610 PROTHROMBIN TIME: CPT | Performed by: THORACIC SURGERY (CARDIOTHORACIC VASCULAR SURGERY)

## 2020-08-12 PROCEDURE — 80048 BASIC METABOLIC PNL TOTAL CA: CPT | Performed by: NURSE PRACTITIONER

## 2020-08-12 RX ORDER — WARFARIN SODIUM 7.5 MG/1
7.5 TABLET ORAL NIGHTLY
Qty: 14 TABLET | Refills: 1 | Status: SHIPPED | OUTPATIENT
Start: 2020-08-12 | End: 2020-08-21

## 2020-08-12 RX ORDER — METOPROLOL SUCCINATE 25 MG/1
25 TABLET, EXTENDED RELEASE ORAL DAILY
Qty: 90 TABLET | Refills: 3 | Status: SHIPPED | OUTPATIENT
Start: 2020-08-12 | End: 2021-06-08

## 2020-08-12 RX ORDER — ENOXAPARIN SODIUM 150 MG/ML
1 INJECTION SUBCUTANEOUS ONCE
Status: DISCONTINUED | OUTPATIENT
Start: 2020-08-12 | End: 2020-08-12

## 2020-08-12 RX ORDER — MIDAZOLAM HYDROCHLORIDE 1 MG/ML
INJECTION INTRAMUSCULAR; INTRAVENOUS
Status: COMPLETED
Start: 2020-08-12 | End: 2020-08-12

## 2020-08-12 RX ORDER — SODIUM CHLORIDE 9 MG/ML
INJECTION, SOLUTION INTRAVENOUS CONTINUOUS
Status: DISCONTINUED | OUTPATIENT
Start: 2020-08-12 | End: 2020-08-12

## 2020-08-12 RX ORDER — MIDAZOLAM HYDROCHLORIDE 1 MG/ML
1 INJECTION INTRAMUSCULAR; INTRAVENOUS EVERY 5 MIN PRN
Status: DISCONTINUED | OUTPATIENT
Start: 2020-08-12 | End: 2020-08-12 | Stop reason: ALTCHOICE

## 2020-08-12 RX ORDER — AMIODARONE HYDROCHLORIDE 400 MG/1
400 TABLET ORAL 2 TIMES DAILY
Qty: 60 TABLET | Refills: 3 | Status: SHIPPED | OUTPATIENT
Start: 2020-08-12 | End: 2020-08-20

## 2020-08-12 NOTE — PROGRESS NOTES
[facility]  CV Surgery Progress Note    Tae Cervantes Patient Status:  Inpatient    1950 MRN [id number]   [facility] 6NE-A Attending Lacey Clay MD   Hosp Day # 7 PCP ENDY SHERMAN     Subjective:  Patient seen and examine new pulmonary opacity. CONCLUSION:  Shallow inspiration and bibasilar atelectasis. No significant change. Assessment/Plan:   # POD 7 s/p AVR, ANDREW ligation, PVI   -HD stable.  Cardiology following   -mild post op anemia and TCP improving - HIPA negative

## 2020-08-12 NOTE — PLAN OF CARE
Pt alert and oriented x4. On RA. Denies any SOB or chest pain. A fib on tele. C/o of headaches overnight (baseline for pt), chest incision pain. Prn percocet given Q6H. Continent to bowel and bladder. QID accuchecks. Up at 8402 itsDapper.     Problem: PAIN - ADULT  Go and administer replacement therapy as ordered  Outcome: Progressing     Problem: Impaired Functional Mobility  Goal: Achieve highest/safest level of mobility/gait  Description  Interventions:  - Assess patient's functional ability and stability  - Promote

## 2020-08-12 NOTE — PLAN OF CARE
Received report on this Pt., at 1550 with bedside rounding. Pt., awake, A&Ox4, calm, pleasant and cooperative, in AFib on Tele monitor, sats greater than 92% on RA. Pt., was waiting for family to come and take him home. Discharge orders on chart.  Chart rev

## 2020-08-12 NOTE — PHYSICAL THERAPY NOTE
PHYSICAL THERAPY TREATMENT NOTE - INPATIENT    Room Number: 4209/6733-M     Session: 3  Number of Visits to Meet Established Goals: 3    Presenting Problem: S/p AVR on 8/5/20     History related to current admission: Pt is s/p AVR on 8/5.     PMH: CURT guaman, Sitting: Good  Dynamic Sitting: Good           Static Standing: Fair -  Dynamic Standing: Fair -    ACTIVITY TOLERANCE                         O2 WALK                    AM-PAC '6-Clicks' INPATIENT SHORT FORM - BASIC MOBILITY  How much difficulty does the RECOMMENDATIONS  PT Discharge Recommendations: Home with home health PT     PLAN  PT Treatment Plan: Bed mobility; Body mechanics; Patient education; Family education;Gait training;Strengthening;Transfer training;Balance training  Rehab Potential : Good  Freq

## 2020-08-12 NOTE — PROCEDURES
[address] [facility] Cardiology  Transesophageal Echocardiogram Report    PREOPERATIVE DIAGNOSIS:   atrial fibrillation    POSTOPERATIVE DIAGNOSIS:  same as above    PROCEDURE PERFORMED: Transesophageal echocardiogram with Color and Spectral Doppler     TE gastric position. Further imaging was obtained. It was then slowly withdrawn orally. No complications were noted at the end of the procedure.     FINDINGS:    2D echocardiography:  The left ventricle appears normal in size, thickness, with normal systoli likely remnant atrial tissue, thrombus can not be definitively excluded. 4. Negative agitated saline study for inter-atrial communication. RECOMMENDATIONS:  1 month of uninterrupted therapeutic anticoagulation, consider elective DCCV thereafter.

## 2020-08-12 NOTE — PROGRESS NOTES
[facility]  Progress Note    Shana Lozano Patient Status:  Inpatient    1950 MRN [id number]   [facility] 6NE-A Attending Balaji Mcguire MD   Hosp Day # 7 PCP ENDY SHERMAN       Assessment/Plan: 78year-old gentleman with un Ventricular rate 100 BPM    Atrial rate 96 BPM    P-R Interval  ms    QRS Duration 96 ms    Q-T Interval 380 ms    QTC Calculation (Bezet) 490 ms    P Axis  degrees    R Axis -6 degrees    T Axis 152 degrees   POCT GLUCOSE    Collection Time: 08/11/20 [address] [facility]

## 2020-08-12 NOTE — PROGRESS NOTES
Dr. Cammy Briceño entered in order for DC. Unsure if cardiology was ok if he could be discharged home after his DAVID recovery so I paged Phoebe MEYER with [facility] cardiology. I received a call back from her that said he was cleared.   Also received a call from JEREMÍAS

## 2020-08-12 NOTE — PLAN OF CARE
Denies c/o pain, malaise, or cardiac symptoms. Remains in controlled AFIB, irregular rate and rhythm. Midsternal incision CDI. Hasn't showered yet. Hoping to have him take his shower after his DAVID/DCCV.   Educated on the procedure and what to expect aft weights  Outcome: Progressing  Goal: Absence of cardiac arrhythmias or at baseline  Description  INTERVENTIONS:  - Continuous cardiac monitoring, monitor vital signs, obtain 12 lead EKG if indicated  - Evaluate effectiveness of antiarrhythmic and heart rat vitals and labs  - See additional Care Plan goals for specific interventions   Outcome: Progressing  Goal: Patient/Family Short Term Goal  Description  Patient's Short Term Goal: adequate pain control    Interventions:   - pain medications as needed  - See

## 2020-08-12 NOTE — PLAN OF CARE
1640 called to pt room, pt sitting in the chair  Profuse sweating noted, denies any sob,denies chest pain  V/s stable,   Possible reaction to IV Johnofer   Dr Deyanira Townsend made aware ,orders noted.  D/c Venofer  1710 Blood sugar check 56  Pt awake, alert ,responsiv

## 2020-08-12 NOTE — DISCHARGE SUMMARY
General Medicine Discharge Summary     Patient ID:  Severo Spry  78year old  6/20/1950    Admit date: 8/5/2020    Discharge date and time:  8/12/20    Attending Physician: Gwenette Curling, MD     Primary resolved     # pAF, HTN hx  -DCCV x1 in OR  -converted back to a-fib last night, amio po started   -per cards, got flecainide 8/4, now holding, amio started per EP  -holding BB; holding ace for now  -okay to start warfarin per CV surgery  -had planned for Inhalation Aero Soln  2 (two) times daily. aspirin 325 MG Oral Tab EC  Take 325 mg by mouth daily. Loratadine (CLARITIN) 10 MG Oral Cap  Take 10 mg by mouth as needed.  SEASONALLY     albuterol sulfate (2.5 MG/3ML) 0.083% Inhalation Nebu Soln  Take by

## 2020-08-12 NOTE — PLAN OF CARE
NURSING ADMISSION NOTE      Patient admitted via Wheelchair  Oriented to room. Safety precautions initiated. Bed in low position. Call light in reach.     Transferred from [facility] ,aware alert  C/o headache and chest incision  No sob, Afib on the monit

## 2020-08-12 NOTE — PROGRESS NOTES
[facility] Cardiology  Progress Note    Mehul Smith Patient Status:  Inpatient    1950 MRN [id number]   [facility] 6NE-A Attending Corrina Mary MD   Hosp Day # 7 [facility]     Outpatient cardiologist:  Jarret Christina kg)  06/18/20 : 257 lb (116.6 kg)  03/11/20 : 259 lb (117.5 kg)      General: Awake and alert; in no acute distress  Cardiac: irregularly irregular; no murmurs/rubs/gallops are appreciated  Lungs: Clear to auscultation bilaterally; no accessory muscle use. injection 50 mL, 50 mL, Intravenous, Q15 Min PRN    Or  glucose (DEX4) oral liquid 30 g, 30 g, Oral, Q15 Min PRN    Or  Glucose-Vitamin C (DEX-4) chewable tab 8 tablet, 8 tablet, Oral, Q15 Min PRN  aspirin EC EC tab 325 mg, 325 mg, Oral, Daily  atorvastati

## 2020-08-14 ENCOUNTER — TELEPHONE (OUTPATIENT)
Dept: CARDIOLOGY UNIT | Facility: HOSPITAL | Age: 70
End: 2020-08-14

## 2020-08-14 NOTE — PROGRESS NOTES
Follow Up Phone Call    1. How are you doing now that you are home? fine    2. Have there been any changes in your wound/incision since going home? One chest tube site was oozing a little bit and covered it with 4x4, currently dry. Patient was taking sponge baths but now will start to shower. 3. Is your pain manageable at home? YEs  4. Are you following the walking routine given to you in the hospital? Percocet 4 x's a day  5. Are you continuing to use your incentive spirometer? 2500    6. Do you have your appointments for   Chest Xray? 8/19                                                            Cardiologist?  Yaima Stokes cardiologist in [place] 8/20 at noon  Primary MD? [facility]       Dr. Aimee Jerry? Carlos Ruiz at 1130                                                             Cardiac Rehab? 7. Do you have any other questions or concerns today? Call Christina Pa for any questions.       Juarez Stringer  8/14/2020  2:48 PM

## 2020-08-19 ENCOUNTER — HOSPITAL ENCOUNTER (OUTPATIENT)
Dept: GENERAL RADIOLOGY | Facility: HOSPITAL | Age: 70
Discharge: HOME | End: 2020-08-19
Attending: THORACIC SURGERY (CARDIOTHORACIC VASCULAR SURGERY)
Payer: MEDICARE

## 2020-08-19 DIAGNOSIS — J90 PLEURAL EFFUSION: ICD-10-CM

## 2020-08-19 PROCEDURE — 71048 X-RAY EXAM CHEST 4+ VIEWS: CPT | Performed by: THORACIC SURGERY (CARDIOTHORACIC VASCULAR SURGERY)

## 2023-05-30 ENCOUNTER — APPOINTMENT (OUTPATIENT)
Dept: URBAN - METROPOLITAN AREA CLINIC 247 | Age: 73
Setting detail: DERMATOLOGY
End: 2023-05-30

## 2023-05-30 DIAGNOSIS — D49.2 NEOPLASM OF UNSPECIFIED BEHAVIOR OF BONE, SOFT TISSUE, AND SKIN: ICD-10-CM

## 2023-05-30 DIAGNOSIS — L57.0 ACTINIC KERATOSIS: ICD-10-CM

## 2023-05-30 PROCEDURE — OTHER LIQUID NITROGEN: OTHER

## 2023-05-30 PROCEDURE — OTHER COUNSELING: OTHER

## 2023-05-30 PROCEDURE — 17003 DESTRUCT PREMALG LES 2-14: CPT

## 2023-05-30 PROCEDURE — 17000 DESTRUCT PREMALG LESION: CPT | Mod: 59

## 2023-05-30 PROCEDURE — 11102 TANGNTL BX SKIN SINGLE LES: CPT

## 2023-05-30 PROCEDURE — A4550 SURGICAL TRAYS: HCPCS

## 2023-05-30 PROCEDURE — OTHER MIPS QUALITY: OTHER

## 2023-05-30 PROCEDURE — OTHER BIOPSY BY SHAVE METHOD: OTHER

## 2023-05-30 ASSESSMENT — LOCATION DETAILED DESCRIPTION DERM
LOCATION DETAILED: LEFT INFERIOR LATERAL MALAR CHEEK
LOCATION DETAILED: LEFT SUPERIOR LATERAL BUCCAL CHEEK
LOCATION DETAILED: LEFT CENTRAL TEMPLE
LOCATION DETAILED: LEFT CENTRAL BUCCAL CHEEK
LOCATION DETAILED: LEFT INFERIOR MEDIAL MALAR CHEEK
LOCATION DETAILED: RIGHT INFERIOR CENTRAL MALAR CHEEK
LOCATION DETAILED: LEFT INFERIOR CENTRAL MALAR CHEEK
LOCATION DETAILED: LEFT INFERIOR FOREHEAD
LOCATION DETAILED: LEFT MEDIAL MALAR CHEEK
LOCATION DETAILED: LEFT SUPERIOR CENTRAL BUCCAL CHEEK

## 2023-05-30 ASSESSMENT — LOCATION SIMPLE DESCRIPTION DERM
LOCATION SIMPLE: RIGHT CHEEK
LOCATION SIMPLE: LEFT CHEEK
LOCATION SIMPLE: LEFT TEMPLE
LOCATION SIMPLE: LEFT FOREHEAD

## 2023-05-30 ASSESSMENT — LOCATION ZONE DERM: LOCATION ZONE: FACE

## 2023-05-30 NOTE — PROCEDURE: BIOPSY BY SHAVE METHOD
Silver Nitrate Text: The wound bed was treated with silver nitrate after the biopsy was performed.
Cryotherapy Text: The wound bed was treated with cryotherapy after the biopsy was performed.
Biopsy Method: Dermablade
Information: Selecting Yes will display possible errors in your note based on the variables you have selected. This validation is only offered as a suggestion for you. PLEASE NOTE THAT THE VALIDATION TEXT WILL BE REMOVED WHEN YOU FINALIZE YOUR NOTE. IF YOU WANT TO FAX A PRELIMINARY NOTE YOU WILL NEED TO TOGGLE THIS TO 'NO' IF YOU DO NOT WANT IT IN YOUR FAXED NOTE.
Consent: Written consent was obtained and risks were reviewed including but not limited to scarring, infection, bleeding, scabbing, incomplete removal, nerve damage and allergy to anesthesia.
Anesthesia Volume In Cc (Will Not Render If 0): 0.2
Render In Bullet Format When Appropriate: No
Electrodesiccation And Curettage Text: The wound bed was treated with electrodesiccation and curettage after the biopsy was performed.
Detail Level: Detailed
Was A Bandage Applied: Yes
Curettage Text: The wound bed was treated with curettage after the biopsy was performed.
Notification Instructions: Patient will be notified of biopsy results. However, patient instructed to call the office if not contacted within 2 weeks.
Body Location Override (Optional - Billing Will Still Be Based On Selected Body Map Location If Applicable): right cheek
X Size Of Lesion In Cm: 0
Electrodesiccation Text: The wound bed was treated with electrodesiccation after the biopsy was performed.
Size Of Lesion In Cm: 0.6
Anesthesia Type: 1% lidocaine with epinephrine
Depth Of Biopsy: dermis
Type Of Destruction Used: Curettage
Billing Type: Third-Party Bill
Wound Care: Petrolatum
Biopsy Type: H and E
Hemostasis: Drysol
Post-Care Instructions: I reviewed with the patient in detail post-care instructions. Patient is to keep the biopsy site dry overnight, and then apply bacitracin twice daily until healed. Patient may apply hydrogen peroxide soaks to remove any crusting.
Dressing: bandage

## 2023-05-30 NOTE — PROCEDURE: LIQUID NITROGEN
Detail Level: Detailed
Render Note In Bullet Format When Appropriate: No
Show Aperture Variable?: Yes
Number Of Freeze-Thaw Cycles: 1 freeze-thaw cycle
Duration Of Freeze Thaw-Cycle (Seconds): 10
Application Tool (Optional): Liquid Nitrogen Sprayer
Consent: The patient's consent was obtained including but not limited to risks of crusting, scabbing, blistering, scarring, darker or lighter pigmentary change, recurrence, incomplete removal and infection.
Post-Care Instructions: I reviewed with the patient in detail post-care instructions. Patient is to wear sunprotection, and avoid picking at any of the treated lesions. Pt may apply Vaseline to crusted or scabbing areas.

## 2023-06-02 ENCOUNTER — RX ONLY (RX ONLY)
Age: 73
End: 2023-06-02

## 2023-08-01 ENCOUNTER — APPOINTMENT (OUTPATIENT)
Dept: URBAN - METROPOLITAN AREA CLINIC 247 | Age: 73
Setting detail: DERMATOLOGY
End: 2023-08-01

## 2023-08-01 DIAGNOSIS — L57.0 ACTINIC KERATOSIS: ICD-10-CM

## 2023-08-01 PROCEDURE — OTHER COUNSELING: OTHER

## 2023-08-01 PROCEDURE — 17000 DESTRUCT PREMALG LESION: CPT

## 2023-08-01 PROCEDURE — OTHER PRESCRIPTION MEDICATION MANAGEMENT: OTHER

## 2023-08-01 PROCEDURE — OTHER LIQUID NITROGEN: OTHER

## 2023-08-01 ASSESSMENT — LOCATION SIMPLE DESCRIPTION DERM
LOCATION SIMPLE: RIGHT CHEEK
LOCATION SIMPLE: LEFT CHEEK

## 2023-08-01 ASSESSMENT — LOCATION DETAILED DESCRIPTION DERM
LOCATION DETAILED: LEFT SUPERIOR LATERAL BUCCAL CHEEK
LOCATION DETAILED: RIGHT INFERIOR CENTRAL MALAR CHEEK

## 2023-08-01 ASSESSMENT — LOCATION ZONE DERM: LOCATION ZONE: FACE

## 2023-08-01 NOTE — PROCEDURE: LIQUID NITROGEN
Post-Care Instructions: I reviewed with the patient in detail post-care instructions. Patient is to wear sunprotection, and avoid picking at any of the treated lesions. Pt may apply Vaseline to crusted or scabbing areas.
Duration Of Freeze Thaw-Cycle (Seconds): 0
Detail Level: Detailed
Render Note In Bullet Format When Appropriate: No
Application Tool (Optional): Liquid Nitrogen Sprayer
Show Applicator Variable?: Yes
Consent: The patient's consent was obtained including but not limited to risks of crusting, scabbing, blistering, scarring, darker or lighter pigmentary change, recurrence, incomplete removal and infection.

## 2023-08-01 NOTE — PROCEDURE: PRESCRIPTION MEDICATION MANAGEMENT
Initiate Treatment: Pt will now start 5FU/ilda apply to lower right cheek for an AK. Pt has Rx at home.
Render In Strict Bullet Format?: No
Detail Level: Simple
Discontinue Regimen: Fluorouracil topical (pt completed a month ago)

## 2024-06-12 ENCOUNTER — APPOINTMENT (OUTPATIENT)
Dept: URBAN - METROPOLITAN AREA CLINIC 247 | Age: 74
Setting detail: DERMATOLOGY
End: 2024-06-12

## 2024-06-12 DIAGNOSIS — L259 CONTACT DERMATITIS AND OTHER ECZEMA, UNSPECIFIED CAUSE: ICD-10-CM

## 2024-06-12 PROBLEM — L23.9 ALLERGIC CONTACT DERMATITIS, UNSPECIFIED CAUSE: Status: ACTIVE | Noted: 2024-06-12

## 2024-06-12 PROCEDURE — OTHER ADDITIONAL NOTES: OTHER

## 2024-06-12 PROCEDURE — OTHER COUNSELING: OTHER

## 2024-06-12 PROCEDURE — 99213 OFFICE O/P EST LOW 20 MIN: CPT

## 2024-06-12 PROCEDURE — OTHER PRESCRIPTION: OTHER

## 2024-06-12 PROCEDURE — OTHER MIPS QUALITY: OTHER

## 2024-06-12 PROCEDURE — OTHER PRESCRIPTION MEDICATION MANAGEMENT: OTHER

## 2024-06-12 RX ORDER — CLOBETASOL PROPIONATE 0.5 MG/G
CREAM TOPICAL BID
Qty: 60 | Refills: 1 | Status: ERX | COMMUNITY
Start: 2024-06-12

## 2024-06-12 ASSESSMENT — LOCATION DETAILED DESCRIPTION DERM
LOCATION DETAILED: RIGHT KNEE
LOCATION DETAILED: LEFT DISTAL PRETIBIAL REGION
LOCATION DETAILED: LEFT RADIAL DORSAL HAND
LOCATION DETAILED: LEFT KNEE
LOCATION DETAILED: LEFT ANTERIOR DISTAL THIGH

## 2024-06-12 ASSESSMENT — LOCATION SIMPLE DESCRIPTION DERM
LOCATION SIMPLE: LEFT KNEE
LOCATION SIMPLE: LEFT HAND
LOCATION SIMPLE: LEFT THIGH
LOCATION SIMPLE: LEFT PRETIBIAL REGION
LOCATION SIMPLE: RIGHT KNEE

## 2024-06-12 ASSESSMENT — LOCATION ZONE DERM
LOCATION ZONE: LEG
LOCATION ZONE: HAND

## 2024-06-12 NOTE — HPI: RASH
What Type Of Note Output Would You Prefer (Optional)?: Bullet Format
How Severe Is Your Rash?: moderate
Is This A New Presentation, Or A Follow-Up?: Rash
Additional History: Pt went to urgent care on Monday and got one dose of Prednisone 40mg one dose and Hydrocortisone 0.25% cream rash is somewhat improvment.

## 2024-06-12 NOTE — PROCEDURE: ADDITIONAL NOTES
Additional Notes: Very itch, present 1.5 weeks\\nPt was out in the garden cutting weeds before rash\\nNo other known exposures\\nPt seen urgent care last week, rash present for one week. They prescribed Hydrocortisone and one 40mg dose of prednisone.\\nImproved, but not resolving
Detail Level: Simple
Render Risk Assessment In Note?: no

## 2024-06-12 NOTE — PROCEDURE: PRESCRIPTION MEDICATION MANAGEMENT
Initiate Treatment: Clobetasol cream apply to AA BID for 2 weeks
Discontinue Regimen: Hydrocortisone 0.25% cream as prescribed by urgent care
Detail Level: Zone
Render In Strict Bullet Format?: No

## (undated) DEVICE — COR-KNOT MINI® COMBO KITBASE PACKAGE TYPE - KITEACH STERILE PACKAGE KIT CONTAINS (2) SINGLE PATIENT USE COR-KNOT MINI® DEVICES AND (12) COR-KNOT® QUICK LOADS®.: Brand: COR-KNOT MINI®

## (undated) DEVICE — INTENT TO BE USED WITH SUTURE MATERIAL FOR TISSUE CLOSURE: Brand: RICHARD-ALLAN® NEEDLE 1/2 CIRCLE TAPER

## (undated) DEVICE — CELL SAVER TUBING BRAT

## (undated) DEVICE — SYSTEM ZDRIVE NLTX DISPOSABLE

## (undated) DEVICE — 3M(TM) TEGADERM(TM) TRANSPARENT FILM DRESSING FRAME STYLE 9505W: Brand: 3M™ TEGADERM™

## (undated) DEVICE — SUTURE SILK 2-0

## (undated) DEVICE — SUTURE SILK 0 FSL

## (undated) DEVICE — TRANSPOSAL ULTRAFLEX DUO/QUAD ULTRA CART MANIFOLD

## (undated) DEVICE — SUTURE PROLENE 4-0 V-5

## (undated) DEVICE — GLOVE SURG TRIUMPH SZ 8

## (undated) DEVICE — CABLE PT WHITE

## (undated) DEVICE — SPONGE LAP 18X18 XRAY STRL

## (undated) DEVICE — 3M™ TEGADERM™ TRANSPARENT FILM DRESSING, 1626W, 4 IN X 4-3/4 IN (10 CM X 12 CM), 50 EACH/CARTON, 4 CARTON/CASE: Brand: 3M™ TEGADERM™

## (undated) DEVICE — BLADE SW 32X6MM  STRNM

## (undated) DEVICE — FIXED CORE WIRE GUIDE SAFE-T-J, CURVED: Brand: COOK

## (undated) DEVICE — LEAD TEMP PACING UNIPOLAR 6500

## (undated) DEVICE — SUTURE POLYDEK 2-0

## (undated) DEVICE — OPEN HEART: Brand: MEDLINE INDUSTRIES, INC.

## (undated) DEVICE — 3M™ BAIR HUGGER® CARDIAC ACCESS BLANKET, 5 PER CASE 63000: Brand: BAIR HUGGER™

## (undated) DEVICE — SUTURE POLYDEK 3-0 69-738

## (undated) DEVICE — SUTURE WIRE DOUBLE STERNOTOMY

## (undated) DEVICE — GAUZE SPONGES,12 PLY: Brand: CURITY

## (undated) DEVICE — CELL SAVER BAG 600ML 4R2023

## (undated) DEVICE — COR-KNOT® QUICK LOAD® SINGLES: Brand: COR-KNOT® QUICK LOAD®

## (undated) DEVICE — STERILE POLYISOPRENE POWDER-FREE SURGICAL GLOVES: Brand: PROTEXIS

## (undated) DEVICE — CELL SAVER 5/5+ BOWL KIT-225ML: Brand: HAEMONETICS CELL SAVER 5/5+ SYSTEMS

## (undated) DEVICE — SUTURE PROLENE 5-0 C-1

## (undated) DEVICE — BARD® PTFE FELT PLEDGETS, (OVAL), 4.8 MM X 6 MM: Brand: BARD® PTFE FELT PLEDGETS

## (undated) DEVICE — SUTURE PROLENE 4-0 SH

## (undated) DEVICE — TOURNIQUET KIT 7 IN

## (undated) DEVICE — SOL  .9 1000ML BTL

## (undated) DEVICE — CELL SAVER RESERVOIR BRAT

## (undated) DEVICE — SOL LACT RINGERS 1000ML

## (undated) DEVICE — HAND PIECE LEFT ATRICURE

## (undated) DEVICE — 1840 FOAM BLOCK NEEDLE COUNTER: Brand: DEVON

## (undated) DEVICE — BLOWER CO2 MEDTRONIC/DLP 22150

## (undated) DEVICE — Device

## (undated) DEVICE — CABLE PT BLUE

## (undated) NOTE — LETTER
Jessica Coy M.D., F.A.C.S. Teresa Balderas M.D., F.A.C.S. Randy Kaur M.D., Edna Torres. MARY Reyna M.D., F.A.C.S. Nemo Drew. Elliott Zamorano M.D., F.A.C.S. Butch Bustillo M.D. CINDY Gomez M.D., F. chance to have all of your questions and concerns answered. If there are any issues which have not been adequately addressed, we ask you to bring them forward so that we can thoroughly address them.     A patient who is fully informed and understands their treatment, among other options and the risks and benefits of the different treatment options:    Yes _____ No _____    A [facility] surgeon as explained to me that if I should so desire, he/she is willing to explain my case and the surgical and non-surgical optio

## (undated) NOTE — LETTER
[address] [facility] FOR BLOOD OR BLOOD COMPONENTS      In the course of your treatment, it may become necessary to administer a transfusion of blood or blood components.  This form provides basic information concerning this proc explain the alternatives to you if it has not already been done. I,Jerica,Claude, have read/had read to me the above. I understand the matters bearing on the decision whether or not to authorize a transfusion of blood or blood components.  I have no questi

## (undated) NOTE — LETTER
1. I authorize the performance upon Ben Thomas the following:    Transesopheageal Echocardiogram with Direct Current Cardioversion of Atrial Fibrillation  2.  I authorize  ___Kim______________________ (and whomever is designated as the doctor’s assist

## (undated) NOTE — LETTER
Iliana Glass [address]  [address] [facility]  Authorization for Surgical Operation and Procedure     Date:___________                                                                                                         Time:__________ and/or blood products. The following are some, but not all, of the potential risks that can occur: fever and allergic reactions, hemolytic reactions, transmission of diseases such as Hepatitis, AIDS and Cytomegalovirus (CMV) and fluid overload.   In the ev (or a person authorized to consent on my behalf). The surgeon or my attending physician will determine when the applicable recovery period ends for purposes of reinstating the DNAR order.   10. Patients having a sterilization procedure: I understand that if 2. As the patient asking for anesthesia services, I agree to:  a. Allow the anesthesiologist (anesthesia doctor) to give me medicine and do additional procedures as necessary.  Some examples are: Starting or using an “IV” to give me medicine, fluids or bloo Very rare risks include infection, bleeding, seizure, irregular heart rhythms and nerve injury. 7. Regional Anesthesia (“spinal”, “epidural”, & “nerve blocks”):   I understand that rare but potential complications include headache, bleeding, infection, sei